# Patient Record
Sex: FEMALE | Race: WHITE | NOT HISPANIC OR LATINO | Employment: OTHER | URBAN - METROPOLITAN AREA
[De-identification: names, ages, dates, MRNs, and addresses within clinical notes are randomized per-mention and may not be internally consistent; named-entity substitution may affect disease eponyms.]

---

## 2023-11-07 ENCOUNTER — OFFICE VISIT (OUTPATIENT)
Dept: PRIMARY CARE | Facility: CLINIC | Age: 83
End: 2023-11-07
Payer: MEDICARE

## 2023-11-07 ENCOUNTER — LAB (OUTPATIENT)
Dept: LAB | Facility: LAB | Age: 83
End: 2023-11-07
Payer: MEDICARE

## 2023-11-07 VITALS — DIASTOLIC BLOOD PRESSURE: 68 MMHG | WEIGHT: 114 LBS | BODY MASS INDEX: 20.85 KG/M2 | SYSTOLIC BLOOD PRESSURE: 114 MMHG

## 2023-11-07 DIAGNOSIS — I10 PRIMARY HYPERTENSION: ICD-10-CM

## 2023-11-07 DIAGNOSIS — Z00.00 HEALTH CARE MAINTENANCE: Primary | ICD-10-CM

## 2023-11-07 DIAGNOSIS — E78.2 MIXED HYPERLIPIDEMIA: ICD-10-CM

## 2023-11-07 DIAGNOSIS — R23.2 HOT FLASHES: ICD-10-CM

## 2023-11-07 DIAGNOSIS — G47.00 INSOMNIA, UNSPECIFIED TYPE: ICD-10-CM

## 2023-11-07 PROCEDURE — 1036F TOBACCO NON-USER: CPT | Performed by: INTERNAL MEDICINE

## 2023-11-07 PROCEDURE — 3074F SYST BP LT 130 MM HG: CPT | Performed by: INTERNAL MEDICINE

## 2023-11-07 PROCEDURE — 99214 OFFICE O/P EST MOD 30 MIN: CPT | Performed by: INTERNAL MEDICINE

## 2023-11-07 PROCEDURE — 36415 COLL VENOUS BLD VENIPUNCTURE: CPT

## 2023-11-07 PROCEDURE — 1159F MED LIST DOCD IN RCRD: CPT | Performed by: INTERNAL MEDICINE

## 2023-11-07 PROCEDURE — 1160F RVW MEDS BY RX/DR IN RCRD: CPT | Performed by: INTERNAL MEDICINE

## 2023-11-07 PROCEDURE — 84443 ASSAY THYROID STIM HORMONE: CPT

## 2023-11-07 PROCEDURE — 3078F DIAST BP <80 MM HG: CPT | Performed by: INTERNAL MEDICINE

## 2023-11-07 PROCEDURE — 1126F AMNT PAIN NOTED NONE PRSNT: CPT | Performed by: INTERNAL MEDICINE

## 2023-11-07 PROCEDURE — G0439 PPPS, SUBSEQ VISIT: HCPCS | Performed by: INTERNAL MEDICINE

## 2023-11-07 PROCEDURE — 99397 PER PM REEVAL EST PAT 65+ YR: CPT | Performed by: INTERNAL MEDICINE

## 2023-11-07 PROCEDURE — 80061 LIPID PANEL: CPT

## 2023-11-07 PROCEDURE — 80053 COMPREHEN METABOLIC PANEL: CPT

## 2023-11-07 PROCEDURE — 93000 ELECTROCARDIOGRAM COMPLETE: CPT | Performed by: INTERNAL MEDICINE

## 2023-11-07 PROCEDURE — 1170F FXNL STATUS ASSESSED: CPT | Performed by: INTERNAL MEDICINE

## 2023-11-07 PROCEDURE — 85027 COMPLETE CBC AUTOMATED: CPT

## 2023-11-07 NOTE — PROGRESS NOTES
Subjective   Patient ID: Jessica Easton is a 82 y.o. female who presents for No chief complaint on file..    HPI CPE see updated front sheet no chest pain no shortness of breath which she sleeps more but is otherwise doing okay questions about the metoprolol when long ago atrial fibrillation taking mostly for blood pressure at this time bones muscles joints okay digestion okay occasional hot flashes sinus okay no dysuria    Past medical history hypertension    Medication metoprolol    Allergies noted and unchanged    Social history no tobacco    Family history noted and unchanged    Prevention discussed with no longer having colonoscopies discussed with mammogram discussed with vaccinations some exercise 1 to 2 mile walk per day some prior blood work reviewed    Depression screen not depressed    Review of Systems    Objective   There were no vitals taken for this visit.    Physical Exam vital signs noted alert and oriented x3 NCAT PERRLA EOMI nares without discharge OP benign TM normal bilateral EAC clear bilateral no AC nodes no JVD or bruit no thyromegaly chest clear to auscultation and percussion CV regular rate and rhythm S1-S2 without murmur gallop or rub abdomen soft nontender normal active bowel sounds no rebound or guarding no HSM LS spine normal curvature negative straight leg raise negative logroll negative SI joint tenderness extremities no clubbing cyanosis or edema normal distal pulses DTR 2+    Assessment/Plan impression General medical examination hypertension other diagnoses insomnia hot flashes hyperlipidemia Parkinson's disease  Plan check EKG advised on heart and continue with metoprolol check Chem-7 advised on glucose potassium and kidney function check CBC advised on blood count check lipid panel advised on cholesterol profile check TSH advised on hot flashes metabolism good diet regular exercise continue with the Sinemet and follow-up with neurology increase water consumption weight  stability advised on mammogram prefers not to have recheck 3 months based on above TT 60 cc 31    See CT scan of chest stable (check)

## 2023-11-08 LAB
ALBUMIN SERPL BCP-MCNC: 4.2 G/DL (ref 3.4–5)
ALP SERPL-CCNC: 56 U/L (ref 33–136)
ALT SERPL W P-5'-P-CCNC: 7 U/L (ref 7–45)
ANION GAP SERPL CALC-SCNC: 13 MMOL/L (ref 10–20)
AST SERPL W P-5'-P-CCNC: 21 U/L (ref 9–39)
BILIRUB SERPL-MCNC: 1.5 MG/DL (ref 0–1.2)
BUN SERPL-MCNC: 11 MG/DL (ref 6–23)
CALCIUM SERPL-MCNC: 9.6 MG/DL (ref 8.6–10.6)
CHLORIDE SERPL-SCNC: 104 MMOL/L (ref 98–107)
CHOLEST SERPL-MCNC: 193 MG/DL (ref 0–199)
CHOLESTEROL/HDL RATIO: 2
CO2 SERPL-SCNC: 30 MMOL/L (ref 21–32)
CREAT SERPL-MCNC: 0.63 MG/DL (ref 0.5–1.05)
ERYTHROCYTE [DISTWIDTH] IN BLOOD BY AUTOMATED COUNT: 12.6 % (ref 11.5–14.5)
GFR SERPL CREATININE-BSD FRML MDRD: 89 ML/MIN/1.73M*2
GLUCOSE SERPL-MCNC: 83 MG/DL (ref 74–99)
HCT VFR BLD AUTO: 46.9 % (ref 36–46)
HDLC SERPL-MCNC: 95.7 MG/DL
HGB BLD-MCNC: 15.2 G/DL (ref 12–16)
LDLC SERPL CALC-MCNC: 80 MG/DL
MCH RBC QN AUTO: 30.6 PG (ref 26–34)
MCHC RBC AUTO-ENTMCNC: 32.4 G/DL (ref 32–36)
MCV RBC AUTO: 95 FL (ref 80–100)
NON HDL CHOLESTEROL: 97 MG/DL (ref 0–149)
NRBC BLD-RTO: 0 /100 WBCS (ref 0–0)
PLATELET # BLD AUTO: 219 X10*3/UL (ref 150–450)
POTASSIUM SERPL-SCNC: 4.5 MMOL/L (ref 3.5–5.3)
PROT SERPL-MCNC: 6.5 G/DL (ref 6.4–8.2)
RBC # BLD AUTO: 4.96 X10*6/UL (ref 4–5.2)
SODIUM SERPL-SCNC: 142 MMOL/L (ref 136–145)
TRIGL SERPL-MCNC: 85 MG/DL (ref 0–149)
TSH SERPL-ACNC: 1.91 MIU/L (ref 0.44–3.98)
VLDL: 17 MG/DL (ref 0–40)
WBC # BLD AUTO: 6.4 X10*3/UL (ref 4.4–11.3)

## 2023-11-08 NOTE — PROGRESS NOTES
C/C:  Follow up visit    History Of Present Illness  Jessica Easton is a 82 y.o. female presenting for follow up with surveillance imaging for personal h/o lung CA x2.  She is most recently s/p a re-do Left VATS upper lobe wedge with [facility] on 12/29/2021 for a PET-avid slowly enlarging nodule - confirmed pT2aN0 adenosquamous CA.      Since her last visit she has been doing well and remaining active with her Parkinsons. Energy level is good. She denies any respiratory sxs including no SOB, cough, wheezing, congestion or other.      By way of review: patient has a prior history of right lung non-small cell lung cancer for which she underwent resection several years ago with Dr. Hall. Additionally she had a history of a left lung nodule for which she underwent a Left VATS (also with Dr. Hall) but pathology of this lesion was benign. On surveillance imaging last month she was noted to have an enlarging left upper lobe nodule. She was seen by my partner, Dr. Hobbs, who ordered a PET scan for her showing avidity and eventually underwent wedge resection; She had post-op appointment with Oncology given b/l lung cancers and VPI on the most recent but 2/to age/frailty the risks were felt to outweigh the benefits.    She is overall very healthy for her age and walks every day    Past Medical History  She has a past medical history of Essential (primary) hypertension (11/20/2014) and Unspecified atrial fibrillation (CMS/HCC) (08/20/2013).    Social History  She reports that she has never smoked. She has never used smokeless tobacco. She reports that she does not currently use alcohol. She reports that she does not use drugs.      Medications    Current Outpatient Medications:     biotin 1 mg capsule, Take by mouth., Disp: , Rfl:     carbidopa-levodopa (Sinemet)  mg tablet, TAKE ONE AND A HALF TABLETS 3 TIMES A DAY, Disp: , Rfl:     cholecalciferol (Vitamin D-3) 50 MCG (2000 UT) tablet, Take 1 tablet (2,000 Units) by  "mouth once daily., Disp: , Rfl:     metoprolol wesley-hydrochlorothiaz (Dutoprol) 25-12.5 mg tablet, Take 2 tablets by mouth once daily., Disp: , Rfl:     metoprolol tartrate (Lopressor) 25 mg tablet, Take 1 tablet (25 mg) by mouth 2 times a day., Disp: , Rfl:     Allergies  Codeine    Review of Systems:  Review of Systems   Constitutional: No fevers, chills, unexpected weight change  HENT: No sore throat, congestion, or nasal drainage  Eyes: No visual changes or eye itching  Respiratory: see HPI. No cough, worsening dyspnea, wheezing  Cardiac: No chest pain, palpitations, or lower extremity edema  Gastrointestinal: No nausea, vomiting, diarrhea. No abdominal pain  Genitourinary: No dysuria or hematuria  Musculoskeletal: No back pain. No significant myalgias or arthralgias  Neurologic: No headaches, dizziness, or seizures.  Hematologic: No east bleeding or bruising.  Psychiatric: No anxiety or depression.    Physical Exam:  Physical Exam  /60 Comment: HIGH BP  Pulse 64   Temp 36.1 °C (97 °F) (Temporal)   Ht 1.575 m (5' 2\")   Wt 52.6 kg (115 lb 14.4 oz)   SpO2 96%   BMI 21.20 kg/m²   Constitutional:       General: Patient is not in acute distress.     Appearance: Normal appearance; not ill-appearing.   HENT:      Head: Normocephalic.      Nose: No congestion or rhinorrhea.   Cardiovascular:      Rate and Rhythm: Normal rate and regular rhythm.      Pulses: Normal pulses.   Pulmonary:      Effort: Pulmonary effort is normal. No respiratory distress.  No conversational dyspnea     Breath sounds: No stridor. No wheezing.   Abdominal:      General: There is no distension.      Palpations: Abdomen is soft.      Tenderness: There is no abdominal tenderness.   Musculoskeletal:         General: No swelling, tenderness or deformity. Normal range of motion.      Cervical back: Normal range of motion. No rigidity.   Lymphadenopathy:      Cervical: No cervical adenopathy.   Skin:     General: Skin is warm and dry. "   Neurological:      General: No focal deficit present.      Mental Status: Patient is alert and oriented to person, place, and time.   Psychiatric:         Mood and Affect: Mood normal.       Relevant Results:     Pathology:  Accession #: [id number]            Pathologist:                   JALEN HERRERA MD  Date of Procedure:    12/29/2021  Date Received:          12/29/2021  Date Reported           1/6/2022  Submitting Physician:   RENAE COSTA DO  Location:                    [facility]  Other External #    Procedures/Addenda Present                                                               FINAL DIAGNOSIS  A.  LUNG, LEFT UPPER LOBE, WEDGE:  --INVASIVE ADENOSQUAMOUS CARCINOMA.  SEE NOTE AND CASE SUMMARY REPORT.    Note: By immunostaining, the glandular component is positive for TTF-1 and  negative for p40, while the squamous component is positive for p40 and negative  for TTF-1.  The morphologic features and immunostaining results are supportive  of the above diagnosis.  A Movat stain (slide A1) shows evidence of visceral  pleural invasion.  Molecular testing has been ordered and the results will be  issued in an addendum.     B.  HILAR LYMPH NODE:    --EXTENSIVELY HYALINIZED AND CALCIFIED LYMPH NODE WITH NO MALIGNANCY  IDENTIFIED.    C.  INTERNAL MAMMARY LYMPH NODE:    --LYMPH NODE WITH NO EVIDENCE OF MALIGNANCY.    D.  LEVEL 8 LYMPH NODE:    --LYMPH NODE WITH NO EVIDENCE OF MALIGNANCY.    E.  LEVEL 7 LYMPH NODE:    --LYMPH NODE WITH NO EVIDENCE OF MALIGNANCY.    F.  ADDITIONAL LEFT UPPER LOBE MARGIN:    --LUNG PARENCHYMA WITH NO EVIDENCE OF MALIGNANCY.    :  Dr. Wadad Mneimneh (selected slides from part A)    nery    The gross and/or microscopic findings were reviewed in conjunction with  pathology resident, Natalia Gannon M.D.                                                                                                                                                 CASE SUMMARY REPORT        SPECIMEN  Procedure:      Wedge resection  Specimen Laterality:      Left  TUMOR  Tumor Focality:      Single focus  Tumor Site:      Upper lobe of lung     Tumor Size     Total Tumor Size (size of entire tumor):      Greatest Dimension  (Centimeters): 1.0 cm  Histologic Type:      Adenosquamous carcinoma  Visceral Pleura Invasion:      Present  Direct Invasion of Adjacent Structures:      Not applicable (no adjacent  structures present)  Treatment Effect:      No known presurgical therapy  Lymphovascular Invasion:      Not identified  MARGINS  Margin Status for Invasive Carcinoma:      All margins negative for invasive  carcinoma   Closest Margin(s) to Invasive Carcinoma:        Parenchymal   Distance from Invasive Carcinoma to Closest Margin:         At least: 0.5 cm  Margin Status for Non-Invasive Tumor:      Not applicable  REGIONAL LYMPH NODES  Lymph Node(s) from Prior Procedures:      No known prior lymph node sampling  performed  Regional Lymph Node Status:        All regional lymph nodes negative for tumor   Number of Lymph Nodes Examined:         4    Jyoti Site(s) Examined:         7: Subcarinal          8L: Para-esophageal          10L: Hilar          Left: internal mammary  PATHOLOGIC STAGE CLASSIFICATION (pTNM, AJCC 8th Edition)  pT Category:      pT2a  pN Category:      pN0  ADDITIONAL FINDINGS  Additional Findings:      Inflammation: Scattered minute non-necrotizing  granulomas; foci of organizing pneumonia       Fibrosis: Patchy interstitial fibrosis       Emphysema        Respiratory bronchiolitis     Imaging:    CT Chest from today personally reviewed     Assessment/Plan   Diagnoses and all orders for this visit:  NSCLC of left lung (CMS/HCC)  Malignant neoplasm of lung, unspecified laterality, unspecified part of lung (CMS/HCC)  -     CT chest wo IV contrast; Future         Jessicadelilah Easton is a 82 y.o. female s/p re-do Lt VATS wedge for an upper lobe pT2aN0 adenosquamous on 12/29/2021. She is  doing well overall today. CT chest today shows no concerning or acute lesions. Will plan for repeat CT chest in 1 year.       Natalie Chatterjee, DO  Thoracic & Esophageal Surgery

## 2023-11-09 RX ORDER — CHOLECALCIFEROL (VITAMIN D3) 50 MCG
1 TABLET ORAL DAILY
COMMUNITY

## 2023-11-09 RX ORDER — METOPROLOL TARTRATE 25 MG/1
25 TABLET, FILM COATED ORAL 2 TIMES DAILY
COMMUNITY

## 2023-11-09 RX ORDER — CARBIDOPA AND LEVODOPA 25; 100 MG/1; MG/1
TABLET ORAL
COMMUNITY
End: 2023-12-28

## 2023-11-12 ASSESSMENT — ENCOUNTER SYMPTOMS
OCCASIONAL FEELINGS OF UNSTEADINESS: 0
DEPRESSION: 0
LOSS OF SENSATION IN FEET: 0

## 2023-11-12 ASSESSMENT — ACTIVITIES OF DAILY LIVING (ADL)
MANAGING_FINANCES: INDEPENDENT
DOING_HOUSEWORK: INDEPENDENT
TAKING_MEDICATION: INDEPENDENT
BATHING: INDEPENDENT
DRESSING: INDEPENDENT
GROCERY_SHOPPING: INDEPENDENT

## 2023-11-12 ASSESSMENT — PATIENT HEALTH QUESTIONNAIRE - PHQ9
SUM OF ALL RESPONSES TO PHQ9 QUESTIONS 1 AND 2: 0
1. LITTLE INTEREST OR PLEASURE IN DOING THINGS: NOT AT ALL
2. FEELING DOWN, DEPRESSED OR HOPELESS: NOT AT ALL

## 2023-11-14 ENCOUNTER — OFFICE VISIT (OUTPATIENT)
Dept: SURGERY | Facility: CLINIC | Age: 83
End: 2023-11-14
Payer: MEDICARE

## 2023-11-14 ENCOUNTER — ANCILLARY PROCEDURE (OUTPATIENT)
Dept: RADIOLOGY | Facility: CLINIC | Age: 83
End: 2023-11-14
Payer: MEDICARE

## 2023-11-14 VITALS
WEIGHT: 115.9 LBS | DIASTOLIC BLOOD PRESSURE: 60 MMHG | SYSTOLIC BLOOD PRESSURE: 171 MMHG | BODY MASS INDEX: 21.33 KG/M2 | OXYGEN SATURATION: 96 % | HEIGHT: 62 IN | TEMPERATURE: 97 F | HEART RATE: 64 BPM

## 2023-11-14 DIAGNOSIS — C34.90 MALIGNANT NEOPLASM OF UNSPECIFIED PART OF UNSPECIFIED BRONCHUS OR LUNG (MULTI): ICD-10-CM

## 2023-11-14 DIAGNOSIS — C34.92 NSCLC OF LEFT LUNG (MULTI): Primary | ICD-10-CM

## 2023-11-14 DIAGNOSIS — C34.90 MALIGNANT NEOPLASM OF LUNG, UNSPECIFIED LATERALITY, UNSPECIFIED PART OF LUNG (MULTI): ICD-10-CM

## 2023-11-14 PROCEDURE — 71250 CT THORAX DX C-: CPT | Performed by: RADIOLOGY

## 2023-11-14 PROCEDURE — 99214 OFFICE O/P EST MOD 30 MIN: CPT | Performed by: STUDENT IN AN ORGANIZED HEALTH CARE EDUCATION/TRAINING PROGRAM

## 2023-11-14 PROCEDURE — 1126F AMNT PAIN NOTED NONE PRSNT: CPT | Performed by: STUDENT IN AN ORGANIZED HEALTH CARE EDUCATION/TRAINING PROGRAM

## 2023-11-14 PROCEDURE — 71250 CT THORAX DX C-: CPT

## 2023-11-14 PROCEDURE — 1036F TOBACCO NON-USER: CPT | Performed by: STUDENT IN AN ORGANIZED HEALTH CARE EDUCATION/TRAINING PROGRAM

## 2023-11-14 PROCEDURE — 1159F MED LIST DOCD IN RCRD: CPT | Performed by: STUDENT IN AN ORGANIZED HEALTH CARE EDUCATION/TRAINING PROGRAM

## 2023-11-14 PROCEDURE — 99214 OFFICE O/P EST MOD 30 MIN: CPT | Mod: 25 | Performed by: STUDENT IN AN ORGANIZED HEALTH CARE EDUCATION/TRAINING PROGRAM

## 2023-11-14 PROCEDURE — 1160F RVW MEDS BY RX/DR IN RCRD: CPT | Performed by: STUDENT IN AN ORGANIZED HEALTH CARE EDUCATION/TRAINING PROGRAM

## 2023-11-14 RX ORDER — NICOTINE POLACRILEX 2 MG
GUM BUCCAL
COMMUNITY

## 2023-11-14 ASSESSMENT — PAIN SCALES - GENERAL: PAINLEVEL: 0-NO PAIN

## 2023-11-16 ENCOUNTER — TELEPHONE (OUTPATIENT)
Dept: PRIMARY CARE | Facility: CLINIC | Age: 83
End: 2023-11-16
Payer: MEDICARE

## 2023-12-28 DIAGNOSIS — G20.A1 PARKINSON'S DISEASE (MULTI): ICD-10-CM

## 2023-12-28 RX ORDER — CARBIDOPA AND LEVODOPA 25; 100 MG/1; MG/1
TABLET ORAL
Qty: 405 TABLET | Refills: 0 | Status: SHIPPED | OUTPATIENT
Start: 2023-12-28 | End: 2024-03-25

## 2024-03-14 ENCOUNTER — TELEPHONE (OUTPATIENT)
Dept: PRIMARY CARE | Facility: CLINIC | Age: 84
End: 2024-03-14
Payer: MEDICARE

## 2024-03-25 DIAGNOSIS — G20.A1 PARKINSON'S DISEASE (MULTI): ICD-10-CM

## 2024-03-25 RX ORDER — CARBIDOPA AND LEVODOPA 25; 100 MG/1; MG/1
TABLET ORAL
Qty: 270 TABLET | Refills: 1 | Status: SHIPPED | OUTPATIENT
Start: 2024-03-25 | End: 2024-04-04 | Stop reason: SDUPTHER

## 2024-04-01 ENCOUNTER — TELEPHONE (OUTPATIENT)
Dept: NEUROLOGY | Facility: CLINIC | Age: 84
End: 2024-04-01
Payer: MEDICARE

## 2024-04-01 NOTE — TELEPHONE ENCOUNTER
She thinks she might need to increase her carb/levo, is not holding her until her next dose, seems ware off, she is very shaky by the time she needs to take it again. Her fuv not until [place]

## 2024-04-04 ENCOUNTER — OFFICE VISIT (OUTPATIENT)
Dept: NEUROLOGY | Facility: CLINIC | Age: 84
End: 2024-04-04
Payer: MEDICARE

## 2024-04-04 VITALS
WEIGHT: 114 LBS | DIASTOLIC BLOOD PRESSURE: 73 MMHG | SYSTOLIC BLOOD PRESSURE: 139 MMHG | BODY MASS INDEX: 20.98 KG/M2 | HEIGHT: 62 IN | HEART RATE: 62 BPM

## 2024-04-04 DIAGNOSIS — G20.A1 PARKINSON'S DISEASE (MULTI): Primary | ICD-10-CM

## 2024-04-04 DIAGNOSIS — M54.2 NECK PAIN: ICD-10-CM

## 2024-04-04 PROCEDURE — 1036F TOBACCO NON-USER: CPT | Performed by: PSYCHIATRY & NEUROLOGY

## 2024-04-04 PROCEDURE — 99214 OFFICE O/P EST MOD 30 MIN: CPT | Performed by: PSYCHIATRY & NEUROLOGY

## 2024-04-04 RX ORDER — CARBIDOPA AND LEVODOPA 25; 100 MG/1; MG/1
1.5 TABLET ORAL 4 TIMES DAILY
Qty: 270 TABLET | Refills: 1 | Status: SHIPPED | OUTPATIENT
Start: 2024-04-04

## 2024-04-04 NOTE — PATIENT INSTRUCTIONS
We discussed that you are experiencing wearing off of the effective carbidopa/levodopa.  This is common in people with Parkinson's disease and very manageable.    I recommend increasing to 4 doses a day by taking 1.5 tablets at 6 AM, 10 AM, 2 PM and 6 PM.    If you still note wearing off of the effect before you get to your next dose, contact my office and we will consider adding entacapone (a booster) to each dose.    For neck pain we discussed doing range of motion exercises every morning and bedtime.  If this does not adequately relieve the neck pain, you can use acetaminophen (Tylenol) 500 mg as needed, as long as you do not exceed 4 doses per 24 hours.    I'm glad to hear that you are doing so much walking and doing regular exercise classes.  These activities are very beneficial for Parkinson's disease and you should keep them up.    Please see me in the office in 8 months.

## 2024-04-04 NOTE — PROGRESS NOTES
Subjective     Jessica Easton is a 83 y.o. year old female seen in follow-up for Parkinson's disease.    HPI    83-year-old right-handed  woman with past medical history significant for hypertension, atrial fibrillation, right upper lobe lung cancer diagnosed 2008 status post wedge resection, osteopenia, chronic insomnia, hysterectomy, remote former smoking.     I evaluated her initially on 7/25/2022. As detailed in my ambulatory EMR note from that date she presented with complaint of a tremor starting in the left hand and subsequently spreading to other limbs, noted for more than a year. 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 She exhibited rest tremor of left more than right limbs and also bradykinesia and hypokinesia of left more than right limbs as well as reduced blink rate, mildly micrographic handwriting and dexterity complaints. Gait was unremarkable. Head CT was without features of hydrocephalus or significant vascular burden. I discussed the option of a Sinemet trial and she was interested in pursuing this.     I evaluated her again on 11/15/2022. She reported a gratifying response to Sinemet with improvement in coordination, handwriting, and dexterity. She was tolerating 1 tablet of 25/100 mg immediate release 3 times daily quite well. I discussed the option of an increase to 1.5 tablets 3 times daily and she was interested in proceeding with this.    I evaluated her most recently on 6/7/2023.  As detailed in my ambulatory EMR note from that date she continued to note a gratifying response to Sinemet at a dose of 1.5 tablets 3 times daily.  Despite spacing every 6 hours she was not noting wearing off at that time.    She is evaluated again today in the office, accompanied by her daughter.    She contacted me recently regarding wearing off of Sinemet effect.  She has been perceiving lately that about 4 hours after a dose she starts to note this, in  "particular with tremulousness that starts in the left lower extremity and spreads to the rest of the body.    Her current regimen is Sinemet 25/100 mg immediate release 1.5 tablets 3 times daily with doses taken at 6 AM, noon and 6 PM.  30-40 minutes after each dose she feels the effect to kick in.    She reports tolerating Sinemet well without nausea and takes it with just a bit of food, not a full meal.    She indicates no significant issues with mobility.  She is walking 5000 steps a day.  She attends an exercise class twice a week.  She has not noted freezing or festination.  She is not requiring assistive devices.  She denies recent falls.    She denies orthostatic symptoms.    She indicates some minor overactive bladder symptoms and on rare occasions urgency incontinence.  She endorses constipation which is manageable with fiber powder taken twice daily and fruit and vegetables in the diet.  She acknowledges that she could hydrate more conscientiously.    She is bothered by pain in the bilateral posterior cervical regions which her daughter thinks may relate to a tendency to hold her head forward when walking.  She does stretches occasionally but not any kind of regular regimen.  She does not typically take anything for the pain.  She has good and bad days in this regard.    She does not endorse symptoms of REM behavior disorder but does indicate that for decades, long preceding her diagnosis of PD, she has had the experience of a \"hot flash\" in the middle of the night typically about 90 minutes into sleep.  She has no trouble with sleep initiation but once she experiences this symptom, she has difficulty getting back to sleep.       Review of Systems    As per the history of present illness    Patient Active Problem List   Diagnosis    NSCLC of left lung (CMS/HCC)     Past Medical History:   Diagnosis Date    Essential (primary) hypertension 11/20/2014    Benign essential hypertension    Unspecified atrial " fibrillation (CMS/[facility]) 08/20/2013    Atrial fibrillation     Past Surgical History:   Procedure Laterality Date    HYSTERECTOMY  04/24/2015    Hysterectomy    LUNG SURGERY  12/07/2021    Lung Surgery    OTHER SURGICAL HISTORY  11/18/2022    Lung wedge resection    OTHER SURGICAL HISTORY  04/24/2015    Wrist Carpectomy    TONSILLECTOMY  10/21/2015    Tonsillectomy     Social History     Tobacco Use    Smoking status: Never    Smokeless tobacco: Never   Substance Use Topics    Alcohol use: Not Currently     family history is not on file.    Current Outpatient Medications:     biotin 1 mg capsule, Take by mouth., Disp: , Rfl:     carbidopa-levodopa (Sinemet)  mg tablet, INCREASE AS DIRECTED TO ONE TABLET 3 TIMES A DAY., Disp: 270 tablet, Rfl: 1    cholecalciferol (Vitamin D-3) 50 MCG (2000 UT) tablet, Take 1 tablet (2,000 Units) by mouth once daily., Disp: , Rfl:     metoprolol wesley-hydrochlorothiaz (Dutoprol) 25-12.5 mg tablet, Take 2 tablets by mouth once daily., Disp: , Rfl:     metoprolol tartrate (Lopressor) 25 mg tablet, Take 1 tablet (25 mg) by mouth 2 times a day., Disp: , Rfl:   Allergies   Allergen Reactions    Codeine Nausea/vomiting       Objective   Neurological Exam  Physical Exam    Physical Examination:    General: Alert woman who was ambulatory without assistive devices.      Mental Status: Clear sensorium without fluctuation.  Appropriate conversation.  Fluent unremarkable speech without paraphasic errors or hesitancy.  Followed instructions accurately and promptly without bradyphrenia.    Cranial Nerves: Mildly reduced blink rate.  No gaze deviation or ptosis.  Symmetrically intact facial motor function.  No significant hypomimia.  Mildly hard of hearing.  No dysarthria, dysphonia or hypophonia.    Motor: Muscle tone was normal in the right limbs and marginally increased at the left elbow and left knee.  Confrontation strength was symmetrically 5/5 throughout.  She easily stood from the office  chair with arms crossed over her chest.    Coordination: No postural or rest tremor, myoclonus or dystonic posturing.  No dyskinesia.  Repetitive hand movements marginally lower in amplitude on the left compared to the right.  Repetitive foot taps moderately lower in amplitude and slower on the left compared to right.    Station: Intact and stable.    Gait: Stable and unremarkable.  She moved fluidly at a moderate pace with symmetric arm swing and no shuffling, freezing or festination.  No significant postural flexion.    Other: Neck active range of motion was full in all directions and painless.        Assessment/Plan     She continues to note significant benefit from Sinemet but has lately noted wearing off of affect at roughly 4 hours, manifested by return of tremor.    I reviewed strategies for wearing off.  I discussed closer spacing of doses versus adding a Sinemet booster such as entacapone.  We decided on closer spacing first and I advised changing dosage times to 6 AM, 10 AM, 2 PM and 6 PM.    She asked about increasing the Sinemet dose but I do not think that she requires a higher dose than 1.5 tablets given that she appears well-controlled at the time of the visit today after her morning dose.    I advised her to contact the office if she is still experiencing wearing off despite every 4 hour spacing of doses, in which case I would consider adding entacapone.    I encouraged her to continue her daily walking regimen as well as regular exercise classes.    I reviewed active range of motion exercises for her to perform every morning and night to hopefully reduce the likelihood of developing neck pain.  I advised that she can take acetaminophen 500 mg up to 4 doses per 24 hours if needed for neck pain.    I advised her to follow-up in the office in 8 months.

## 2024-06-17 ENCOUNTER — TELEPHONE (OUTPATIENT)
Dept: PRIMARY CARE | Facility: CLINIC | Age: 84
End: 2024-06-17

## 2024-06-17 ENCOUNTER — TELEMEDICINE (OUTPATIENT)
Dept: PRIMARY CARE | Facility: CLINIC | Age: 84
End: 2024-06-17
Payer: MEDICARE

## 2024-06-17 DIAGNOSIS — U07.1 COVID-19 VIRUS INFECTION: Primary | ICD-10-CM

## 2024-06-17 DIAGNOSIS — R55 SYNCOPE, UNSPECIFIED SYNCOPE TYPE: ICD-10-CM

## 2024-06-17 DIAGNOSIS — J01.90 ACUTE SINUSITIS, RECURRENCE NOT SPECIFIED, UNSPECIFIED LOCATION: ICD-10-CM

## 2024-06-17 PROCEDURE — 99442 PR PHYS/QHP TELEPHONE EVALUATION 11-20 MIN: CPT | Performed by: INTERNAL MEDICINE

## 2024-06-17 NOTE — PROGRESS NOTES
Subjective   Patient ID: Jessica Easton is a 83 y.o. female who presents for No chief complaint on file..    HPI patient initiated telehealth visit this visit was completed via telephone secondary to the restrictions of the COVID-19 pandemic all medical issues were addressed and discussed with patient patient was not otherwise examined if it was felt that the patient needed to be seen in the office they would have been referred to do so patient verbally consented to visit  Sick visit had tested positive for COVID today after 2 days of prodromal illness mostly having sinus type symptoms although this morning before she had taken her carbidopa levodopa medicine she had gently she thinks passed out she had lowered to the floor thinks she may have been awake during that time was not injured in any way did not hit her head then was able to slowly get up and proceed with her breakfast and medication had had low-grade fever over the past 2 days sinus discharge not much in the way of coughing some rundown feeling no chest pain no shortness of breath    Review of Systems    Objective   There were no vitals taken for this visit.    Physical Exam alert and oriented x 3 breathing unlabored not otherwise examined    Assessment/Plan  impression covid + syncope sinusitis   plan ok for Paxlovid therapy 3 tablets in the morning 3 tablets in the evening x 5 days see EMR risk-benefit side effects reviewed with her and wishes to proceed May use Tylenol as needed for the fever Zyrtec as needed for the sinus her medications were reviewed as for the episode earlier advised on maintaining hydration in a week where the weather is to be greater than 90 degrees and check on her home blood pressures if needed then recheck if no better and for regular visit

## 2024-06-23 NOTE — PROGRESS NOTES
Jovan Valdes MD  Physician  Primary Care     Progress Notes      Sign when Signing Visit     Creation Time: 6/17/2024  6:00 PM     Sign when Signing Visit       Expand All Collapse All       Subjective   Patient ID: Jessica Easton is a 83 y.o. female who presents for No chief complaint on file..     HPI patient initiated telehealth visit this visit was completed via telephone secondary to the restrictions of the COVID-19 pandemic all medical issues were addressed and discussed with patient patient was not otherwise examined if it was felt that the patient needed to be seen in the office they would have been referred to do so patient verbally consented to visit  Sick visit had tested positive for COVID today after 2 days of prodromal illness mostly having sinus type symptoms although this morning before she had taken her carbidopa levodopa medicine she had gently she thinks passed out she had lowered to the floor thinks she may have been awake during that time was not injured in any way did not hit her head then was able to slowly get up and proceed with her breakfast and medication had had low-grade fever over the past 2 days sinus discharge not much in the way of coughing some rundown feeling no chest pain no shortness of breath     Review of Systems           Objective   There were no vitals taken for this visit.     Physical Exam alert and oriented x 3 breathing unlabored not otherwise examined           Assessment/Plan   impression covid + syncope sinusitis  plan ok for Paxlovid therapy 3 tablets in the morning 3 tablets in the evening x 5 days see EMR risk-benefit side effects reviewed with her and wishes to proceed May use Tylenol as needed for the fever Zyrtec as needed for the sinus her medications were reviewed as for the episode earlier advised on maintaining hydration in a week where the weather is to be greater than 90 degrees and check on her home blood pressures if needed then recheck if no  better and for regular visit                            Orders Only on 6/17/2024       Additional Documentation    Encounter Info: Billing Info,     History,     Allergies     Orders Placed    None  Medication Changes      nirmatrelvir/ritonavir 300 mg (150 mg x 2)-100 mg 3 tablets oral 2 times daily, Follow the instructions on the package  Medication List  Visit Diagnoses      COVID-19 virus infection  Problem List

## 2024-07-24 DIAGNOSIS — G20.A1 PARKINSON'S DISEASE (MULTI): ICD-10-CM

## 2024-07-24 RX ORDER — CARBIDOPA AND LEVODOPA 25; 100 MG/1; MG/1
1.5 TABLET ORAL 4 TIMES DAILY
Qty: 270 TABLET | Refills: 1 | Status: SHIPPED | OUTPATIENT
Start: 2024-07-24

## 2024-09-27 ENCOUNTER — APPOINTMENT (OUTPATIENT)
Dept: DERMATOLOGY | Facility: CLINIC | Age: 84
End: 2024-09-27
Payer: MEDICARE

## 2024-10-19 DIAGNOSIS — G20.A1 PARKINSON'S DISEASE (MULTI): ICD-10-CM

## 2024-10-21 RX ORDER — CARBIDOPA AND LEVODOPA 25; 100 MG/1; MG/1
1.5 TABLET ORAL 4 TIMES DAILY
Qty: 540 TABLET | Refills: 1 | Status: SHIPPED | OUTPATIENT
Start: 2024-10-21

## 2024-11-12 ENCOUNTER — OFFICE VISIT (OUTPATIENT)
Dept: SURGERY | Facility: CLINIC | Age: 84
End: 2024-11-12
Payer: MEDICARE

## 2024-11-12 ENCOUNTER — APPOINTMENT (OUTPATIENT)
Dept: PRIMARY CARE | Facility: CLINIC | Age: 84
End: 2024-11-12
Payer: MEDICARE

## 2024-11-12 ENCOUNTER — HOSPITAL ENCOUNTER (OUTPATIENT)
Dept: RADIOLOGY | Facility: CLINIC | Age: 84
Discharge: HOME | End: 2024-11-12
Payer: MEDICARE

## 2024-11-12 VITALS
OXYGEN SATURATION: 98 % | DIASTOLIC BLOOD PRESSURE: 78 MMHG | HEART RATE: 69 BPM | SYSTOLIC BLOOD PRESSURE: 181 MMHG | HEIGHT: 62 IN | TEMPERATURE: 97.3 F | WEIGHT: 108 LBS | BODY MASS INDEX: 19.88 KG/M2

## 2024-11-12 DIAGNOSIS — C34.90 MALIGNANT NEOPLASM OF LUNG, UNSPECIFIED LATERALITY, UNSPECIFIED PART OF LUNG (MULTI): ICD-10-CM

## 2024-11-12 PROCEDURE — 99214 OFFICE O/P EST MOD 30 MIN: CPT | Performed by: STUDENT IN AN ORGANIZED HEALTH CARE EDUCATION/TRAINING PROGRAM

## 2024-11-12 PROCEDURE — 1159F MED LIST DOCD IN RCRD: CPT | Performed by: STUDENT IN AN ORGANIZED HEALTH CARE EDUCATION/TRAINING PROGRAM

## 2024-11-12 PROCEDURE — 71250 CT THORAX DX C-: CPT

## 2024-11-12 PROCEDURE — 71250 CT THORAX DX C-: CPT | Performed by: STUDENT IN AN ORGANIZED HEALTH CARE EDUCATION/TRAINING PROGRAM

## 2024-11-12 PROCEDURE — 99214 OFFICE O/P EST MOD 30 MIN: CPT | Mod: 25 | Performed by: STUDENT IN AN ORGANIZED HEALTH CARE EDUCATION/TRAINING PROGRAM

## 2024-11-12 NOTE — PROGRESS NOTES
C/C:  Follow up visit    History Of Present Illness  Jessica Easton is a 83 y.o. female presenting for follow up with surveillance imaging for personal h/o lung CA x2. She is most recently s/p a re-do Left VATS upper lobe wedge with MLND on 12/29/2021 for a PET-avid slowly enlarging nodule - confirmed pT2aN0 adenosquamous CA.      Since her last visit she has been doing well overall. Feeling frustrated with her Parkinsons. Having some worsening issues with sleep. Energy level is good. She denies any respiratory sxs including no SOB, cough, wheezing, congestion or other.      By way of review: patient has a prior history of right lung non-small cell lung cancer for which she underwent resection several years ago with Dr. Hall. Additionally she had a history of a left lung nodule for which she underwent a Left VATS (also with Dr. Hall) but pathology of this lesion was benign. On surveillance imaging last month she was noted to have an enlarging left upper lobe nodule. She was seen by my partner, Dr. Hobbs, who ordered a PET scan for her showing avidity and eventually underwent wedge resection; She had post-op appointment with Oncology given b/l lung cancers and VPI on the most recent but 2/to age/frailty the risks were felt to outweigh the benefits.   She is overall very healthy for her age other than her Parkinsons disease and walks every day    Past Medical History  She has a past medical history of Essential (primary) hypertension (11/20/2014) and Unspecified atrial fibrillation (Multi) (08/20/2013).    Social History  She reports that she has never smoked. She has never used smokeless tobacco. She reports that she does not currently use alcohol. She reports that she does not use drugs.      Medications    Current Outpatient Medications:     biotin 1 mg capsule, Take by mouth., Disp: , Rfl:     CALCIUM ORAL, Take by mouth., Disp: , Rfl:     carbidopa-levodopa (Sinemet)  mg tablet, TAKE 1.5 TABLETS BY  "MOUTH 4 TIMES A DAY. TAKE DOSES AT 6 AM, 10 AM, 2 PM AND 6 PM., Disp: 540 tablet, Rfl: 1    cholecalciferol (Vitamin D-3) 50 MCG (2000 UT) tablet, Take 1 tablet (2,000 Units) by mouth once daily., Disp: , Rfl:     cyanocobalamin, vitamin B-12, (VITAMIN B-12 ORAL), Take by mouth., Disp: , Rfl:     FIBER, PSYLLIUM HUSK, ORAL, Take by mouth., Disp: , Rfl:     metoprolol wesley-hydrochlorothiaz (Dutoprol) 25-12.5 mg tablet, Take 2 tablets by mouth once daily., Disp: , Rfl:     metoprolol tartrate (Lopressor) 25 mg tablet, TAKE 1 TABLET BY MOUTH TWICE A DAY (Patient not taking: Reported on 11/12/2024), Disp: 180 tablet, Rfl: 1    Allergies  Codeine    Review of Systems:  Review of Systems   Constitutional: No fevers, chills, unexpected weight change  HENT: No sore throat, congestion, or nasal drainage  Eyes: No visual changes or eye itching  Respiratory: see HPI. No cough, worsening dyspnea, wheezing  Cardiac: No chest pain, palpitations, or lower extremity edema  Gastrointestinal: No nausea, vomiting, diarrhea. No abdominal pain  Genitourinary: No dysuria or hematuria  Musculoskeletal: No back pain. No significant myalgias or arthralgias  Neurologic: No headaches, dizziness, or seizures.  Hematologic: No east bleeding or bruising.  Psychiatric: No anxiety or depression.    Physical Exam:  Physical Exam  BP (!) 181/78   Pulse 69   Temp 36.3 °C (97.3 °F)   Ht 1.575 m (5' 2\")   Wt 49 kg (108 lb)   SpO2 98%   BMI 19.75 kg/m²   Constitutional:       General: Patient is not in acute distress.     Appearance: Normal appearance; not ill-appearing.   HENT:      Head: Normocephalic.      Nose: No congestion or rhinorrhea.   Cardiovascular:      Rate and Rhythm: Normal rate and regular rhythm.      Pulses: Normal pulses.   Pulmonary:      Effort: Pulmonary effort is normal. No respiratory distress.  No conversational dyspnea     Breath sounds: No stridor. No wheezing.   Abdominal:      General: There is no distension.      " Palpations: Abdomen is soft.      Tenderness: There is no abdominal tenderness.   Musculoskeletal:         General: No swelling, tenderness or deformity. Normal range of motion.      Cervical back: Normal range of motion. No rigidity.   Lymphadenopathy:      Cervical: No cervical adenopathy.   Skin:     General: Skin is warm and dry.   Neurological:      General: No focal deficit present.      Mental Status: Patient is alert and oriented to person, place, and time.   Psychiatric:         Mood and Affect: Mood normal.       Relevant Results:     Pathology:  Accession #: [id number]            Pathologist:                   JALEN HERRERA MD  Date of Procedure:    12/29/2021  Date Received:          12/29/2021  Date Reported           1/6/2022  Submitting Physician:   RENAE COSTA DO  Location:                    [facility]  Other External #    Procedures/Addenda Present                                                               FINAL DIAGNOSIS  A.  LUNG, LEFT UPPER LOBE, WEDGE:  --INVASIVE ADENOSQUAMOUS CARCINOMA.  SEE NOTE AND CASE SUMMARY REPORT.    Note: By immunostaining, the glandular component is positive for TTF-1 and  negative for p40, while the squamous component is positive for p40 and negative  for TTF-1.  The morphologic features and immunostaining results are supportive  of the above diagnosis.  A Movat stain (slide A1) shows evidence of visceral  pleural invasion.  Molecular testing has been ordered and the results will be  issued in an addendum.     B.  HILAR LYMPH NODE:    --EXTENSIVELY HYALINIZED AND CALCIFIED LYMPH NODE WITH NO MALIGNANCY  IDENTIFIED.    C.  INTERNAL MAMMARY LYMPH NODE:    --LYMPH NODE WITH NO EVIDENCE OF MALIGNANCY.    D.  LEVEL 8 LYMPH NODE:    --LYMPH NODE WITH NO EVIDENCE OF MALIGNANCY.    E.  LEVEL 7 LYMPH NODE:    --LYMPH NODE WITH NO EVIDENCE OF MALIGNANCY.    F.  ADDITIONAL LEFT UPPER LOBE MARGIN:    --LUNG PARENCHYMA WITH NO EVIDENCE OF MALIGNANCY.    :   Dr. Wadad Mneimneh (selected slides from part A)    jmirandaw    The gross and/or microscopic findings were reviewed in conjunction with  pathology resident, Natalia Gannon M.D.                                                                                                                                                 CASE SUMMARY REPORT       SPECIMEN  Procedure:      Wedge resection  Specimen Laterality:      Left  TUMOR  Tumor Focality:      Single focus  Tumor Site:      Upper lobe of lung     Tumor Size     Total Tumor Size (size of entire tumor):      Greatest Dimension  (Centimeters): 1.0 cm  Histologic Type:      Adenosquamous carcinoma  Visceral Pleura Invasion:      Present  Direct Invasion of Adjacent Structures:      Not applicable (no adjacent  structures present)  Treatment Effect:      No known presurgical therapy  Lymphovascular Invasion:      Not identified  MARGINS  Margin Status for Invasive Carcinoma:      All margins negative for invasive  carcinoma   Closest Margin(s) to Invasive Carcinoma:        Parenchymal   Distance from Invasive Carcinoma to Closest Margin:         At least: 0.5 cm  Margin Status for Non-Invasive Tumor:      Not applicable  REGIONAL LYMPH NODES  Lymph Node(s) from Prior Procedures:      No known prior lymph node sampling  performed  Regional Lymph Node Status:        All regional lymph nodes negative for tumor   Number of Lymph Nodes Examined:         4    Jyoti Site(s) Examined:         7: Subcarinal          8L: Para-esophageal          10L: Hilar          Left: internal mammary  PATHOLOGIC STAGE CLASSIFICATION (pTNM, AJCC 8th Edition)  pT Category:      pT2a  pN Category:      pN0  ADDITIONAL FINDINGS  Additional Findings:      Inflammation: Scattered minute non-necrotizing  granulomas; foci of organizing pneumonia       Fibrosis: Patchy interstitial fibrosis       Emphysema        Respiratory bronchiolitis     Imaging:    CT Chest from today personally reviewed      Assessment/Plan   Diagnoses and all orders for this visit:  Malignant neoplasm of lung, unspecified laterality, unspecified part of lung (Multi)  -     CT chest wo IV contrast; Future     Jessicamadeline Easton is a 83 y.o. female s/p re-do Lt VATS wedge for an upper lobe pT2aN0 adenosquamous on 12/29/2021. She is doing well overall today. CT chest today shows no ZULAY. Will plan for repeat CT chest in 1 year.       Natalie Chatterjee, DO  Thoracic & Esophageal Surgery

## 2024-11-12 NOTE — PATIENT INSTRUCTIONS
"Follow up in 1 year with CT chest prior. See appointment below in \"What's Next\".  Call our office with any questions. [phone]   "

## 2024-11-13 ENCOUNTER — LAB (OUTPATIENT)
Dept: LAB | Facility: LAB | Age: 84
End: 2024-11-13
Payer: MEDICARE

## 2024-11-13 ENCOUNTER — APPOINTMENT (OUTPATIENT)
Dept: PRIMARY CARE | Facility: CLINIC | Age: 84
End: 2024-11-13
Payer: MEDICARE

## 2024-11-13 VITALS
HEART RATE: 66 BPM | OXYGEN SATURATION: 97 % | DIASTOLIC BLOOD PRESSURE: 75 MMHG | BODY MASS INDEX: 19.57 KG/M2 | SYSTOLIC BLOOD PRESSURE: 127 MMHG | WEIGHT: 107 LBS

## 2024-11-13 DIAGNOSIS — G20.A1 PARKINSON'S DISEASE WITHOUT DYSKINESIA, UNSPECIFIED WHETHER MANIFESTATIONS FLUCTUATE: ICD-10-CM

## 2024-11-13 DIAGNOSIS — M54.2 CERVICALGIA OF OCCIPITO-ATLANTO-AXIAL REGION: ICD-10-CM

## 2024-11-13 DIAGNOSIS — Z00.00 HEALTH CARE MAINTENANCE: Primary | ICD-10-CM

## 2024-11-13 DIAGNOSIS — I10 PRIMARY HYPERTENSION: ICD-10-CM

## 2024-11-13 DIAGNOSIS — E78.2 MIXED HYPERLIPIDEMIA: ICD-10-CM

## 2024-11-13 LAB
ALBUMIN SERPL BCP-MCNC: 4.2 G/DL (ref 3.4–5)
ALP SERPL-CCNC: 66 U/L (ref 33–136)
ALT SERPL W P-5'-P-CCNC: 7 U/L (ref 7–45)
ANION GAP SERPL CALC-SCNC: 13 MMOL/L (ref 10–20)
AST SERPL W P-5'-P-CCNC: 21 U/L (ref 9–39)
BILIRUB SERPL-MCNC: 1.3 MG/DL (ref 0–1.2)
BUN SERPL-MCNC: 9 MG/DL (ref 6–23)
CALCIUM SERPL-MCNC: 9.9 MG/DL (ref 8.6–10.6)
CHLORIDE SERPL-SCNC: 101 MMOL/L (ref 98–107)
CHOLEST SERPL-MCNC: 191 MG/DL (ref 0–199)
CHOLESTEROL/HDL RATIO: 1.9
CO2 SERPL-SCNC: 32 MMOL/L (ref 21–32)
CREAT SERPL-MCNC: 0.64 MG/DL (ref 0.5–1.05)
EGFRCR SERPLBLD CKD-EPI 2021: 88 ML/MIN/1.73M*2
ERYTHROCYTE [DISTWIDTH] IN BLOOD BY AUTOMATED COUNT: 12.3 % (ref 11.5–14.5)
GLUCOSE SERPL-MCNC: 96 MG/DL (ref 74–99)
HCT VFR BLD AUTO: 47 % (ref 36–46)
HDLC SERPL-MCNC: 100.2 MG/DL
HGB BLD-MCNC: 15.1 G/DL (ref 12–16)
LDLC SERPL CALC-MCNC: 74 MG/DL
MCH RBC QN AUTO: 30.1 PG (ref 26–34)
MCHC RBC AUTO-ENTMCNC: 32.1 G/DL (ref 32–36)
MCV RBC AUTO: 94 FL (ref 80–100)
NON HDL CHOLESTEROL: 91 MG/DL (ref 0–149)
NRBC BLD-RTO: 0 /100 WBCS (ref 0–0)
PLATELET # BLD AUTO: 260 X10*3/UL (ref 150–450)
POTASSIUM SERPL-SCNC: 4.8 MMOL/L (ref 3.5–5.3)
PROT SERPL-MCNC: 6.9 G/DL (ref 6.4–8.2)
RBC # BLD AUTO: 5.01 X10*6/UL (ref 4–5.2)
SODIUM SERPL-SCNC: 141 MMOL/L (ref 136–145)
TRIGL SERPL-MCNC: 86 MG/DL (ref 0–149)
VLDL: 17 MG/DL (ref 0–40)
WBC # BLD AUTO: 6.1 X10*3/UL (ref 4.4–11.3)

## 2024-11-13 PROCEDURE — 3074F SYST BP LT 130 MM HG: CPT | Performed by: INTERNAL MEDICINE

## 2024-11-13 PROCEDURE — 80053 COMPREHEN METABOLIC PANEL: CPT

## 2024-11-13 PROCEDURE — G0439 PPPS, SUBSEQ VISIT: HCPCS | Performed by: INTERNAL MEDICINE

## 2024-11-13 PROCEDURE — 1170F FXNL STATUS ASSESSED: CPT | Performed by: INTERNAL MEDICINE

## 2024-11-13 PROCEDURE — 93000 ELECTROCARDIOGRAM COMPLETE: CPT | Performed by: INTERNAL MEDICINE

## 2024-11-13 PROCEDURE — 99397 PER PM REEVAL EST PAT 65+ YR: CPT | Performed by: INTERNAL MEDICINE

## 2024-11-13 PROCEDURE — 99214 OFFICE O/P EST MOD 30 MIN: CPT | Performed by: INTERNAL MEDICINE

## 2024-11-13 PROCEDURE — 1160F RVW MEDS BY RX/DR IN RCRD: CPT | Performed by: INTERNAL MEDICINE

## 2024-11-13 PROCEDURE — 85027 COMPLETE CBC AUTOMATED: CPT

## 2024-11-13 PROCEDURE — 1159F MED LIST DOCD IN RCRD: CPT | Performed by: INTERNAL MEDICINE

## 2024-11-13 PROCEDURE — 80061 LIPID PANEL: CPT

## 2024-11-13 PROCEDURE — 1036F TOBACCO NON-USER: CPT | Performed by: INTERNAL MEDICINE

## 2024-11-13 PROCEDURE — 36415 COLL VENOUS BLD VENIPUNCTURE: CPT

## 2024-11-13 PROCEDURE — 3078F DIAST BP <80 MM HG: CPT | Performed by: INTERNAL MEDICINE

## 2024-11-13 NOTE — PROGRESS NOTES
Subjective   Patient ID: Jessica Easton is a 83 y.o. female who presents for Medicare Annual Wellness Visit Initial.    HPI CPE see updated front sheet no chest pain no shortness of breath no falls is doing okay with exercise class regarding the Parkinson's had her CT scan and follow-up with the oncologist yesterday no side effect with medication bowels okay taking B vitamins now    Past medical history hypertension hyperlipidemia    Medication metoprolol biotin    Allergies noted and unchanged    Social history no tobacco    Family history noted and unchanged    Prevention discussed with no longer having colonoscopies discussed with mammogram she prefers none discussed with vaccinations she will be having the flu shot COVID shot and RSV shot some exercise 1 to 2 mile walk per day for at least 5000 steps some prior blood work reviewed    Depression screen not depressed    Review of Systems    Objective   /75   Pulse 66   Wt 48.5 kg (107 lb)   SpO2 97%   BMI 19.57 kg/m²     Physical Exam vital signs noted alert and oriented x3 NCAT PERRLA EOMI nares without discharge OP benign TM normal bilateral EAC clear bilateral no AC nodes no JVD or bruit no thyromegaly chest clear to auscultation and percussion no wheezing no crackles CV regular rate and rhythm S1-S2 without murmur gallop or rub abdomen soft nontender normal active bowel sounds no rebound or guarding no HSM LS spine normal curvature negative straight leg raise negative logroll negative SI joint tenderness extremities no clubbing cyanosis or edema normal distal pulses DTR 2+ cervical spine some stiffness some tight posterior paraspinal muscles    Assessment/Plan impression General medical examination hypertension other diagnoses  cervicalgia hyperlipidemia Parkinson's disease  Plan check EKG advised on heart continue with medication follow-up with oncology continue to watch diet continue with exercise class and walking drinking enough water care and  safety in the home in the home check comprehensive panel advised on glucose potassium and kidney function as well as liver function check lipid panel advised on cholesterol profile check CBC advised on blood count previous TSH was normal heating pad for the neck Tylenol range of motion exercise and/or physical therapy if needed continue with Parkinson medication taking on time encouraged and recheck 3 months obesity 60 cc 31

## 2024-11-24 ASSESSMENT — PATIENT HEALTH QUESTIONNAIRE - PHQ9
1. LITTLE INTEREST OR PLEASURE IN DOING THINGS: NOT AT ALL
SUM OF ALL RESPONSES TO PHQ9 QUESTIONS 1 AND 2: 0
2. FEELING DOWN, DEPRESSED OR HOPELESS: NOT AT ALL

## 2024-11-24 ASSESSMENT — ACTIVITIES OF DAILY LIVING (ADL)
DOING_HOUSEWORK: INDEPENDENT
DRESSING: INDEPENDENT
BATHING: INDEPENDENT
TAKING_MEDICATION: INDEPENDENT
MANAGING_FINANCES: INDEPENDENT
GROCERY_SHOPPING: INDEPENDENT

## 2024-11-24 ASSESSMENT — ENCOUNTER SYMPTOMS
DEPRESSION: 0
OCCASIONAL FEELINGS OF UNSTEADINESS: 0
LOSS OF SENSATION IN FEET: 0

## 2024-12-11 NOTE — PROGRESS NOTES
"Subjective     Jessica Easton is a 84 y.o. female who presents for the following: Skin Exam.  She notes dry, flaky skin on her face, especially around her nose and between her eyebrows.  She denies any new, changing, or concerning skin lesions since her last visit; no bleeding, itching, or burning lesions.      Review of Systems:  No other skin or systemic complaints other than what is documented elsewhere in the note.    The following portions of the chart were reviewed this encounter and updated as appropriate:       Skin Cancer History  No skin cancer on file.    Specialty Problems    None      Past Dermatologic / Past Relevant Medical History:    - history of AKs  - no h/o skin cancer     Family History:    No family history of melanoma or skin cancer    Social History:    The patient's daughter, Davey, accompanies her and works as a nurse in Urology here at ; she goes by \"Saundra\" and pronounces the \"K\" in her last name    Allergies:  Codeine    Current Medications / CAM's:    Current Outpatient Medications:     Comirnaty 2024-25, 12y up,,PF, 30 mcg/0.3 mL syringe, , Disp: , Rfl:     Fluzone High-Dose Triv 24-25 syringe, , Disp: , Rfl:     biotin 1 mg capsule, Take by mouth., Disp: , Rfl:     CALCIUM ORAL, Take by mouth., Disp: , Rfl:     carbidopa-levodopa (Sinemet)  mg tablet, TAKE 1.5 TABLETS BY MOUTH 4 TIMES A DAY. TAKE DOSES AT 6 AM, 10 AM, 2 PM AND 6 PM., Disp: 540 tablet, Rfl: 1    cholecalciferol (Vitamin D-3) 50 MCG (2000 UT) tablet, Take 1 tablet (2,000 Units) by mouth once daily., Disp: , Rfl:     cyanocobalamin, vitamin B-12, (VITAMIN B-12 ORAL), Take by mouth., Disp: , Rfl:     FIBER, PSYLLIUM HUSK, ORAL, Take by mouth., Disp: , Rfl:     ketoconazole (NIZOral) 2 % cream, Apply twice daily to affected areas of face, Disp: 60 g, Rfl: 11    metoprolol wesley-hydrochlorothiaz (Dutoprol) 25-12.5 mg tablet, Take 2 tablets by mouth once daily. (Patient not taking: Reported on 11/13/2024), Disp: , Rfl: "     metoprolol tartrate (Lopressor) 25 mg tablet, TAKE 1 TABLET BY MOUTH TWICE A DAY, Disp: 180 tablet, Rfl: 1     Objective   Well appearing patient in no apparent distress; mood and affect are within normal limits.    A waist-up examination was performed including scalp, face, eyes, ears, nose, lips, neck, chest, axillae, abdomen, back, and bilateral upper extremities. All findings within normal limits unless otherwise noted below.        Assessment/Plan   1. Neoplasm of uncertain behavior of skin (2)  Left Medial Lower Chest  7 mm dark brown pigmented, asymmetric macule with an asymmetric pigment network and irregular borders           Shave removal    Lesion length (cm):  0.7  Margin per side (cm):  0.2  Lesion diameter (cm):  1.1  Informed consent: discussed and consent obtained    Timeout: patient name, date of birth, surgical site, and procedure verified    Procedure prep:  Patient was prepped and draped  Anesthesia: the lesion was anesthetized in a standard fashion    Anesthetic:  1% lidocaine w/ epinephrine 1-100,000 local infiltration  Instrument used: flexible razor blade    Hemostasis achieved with: aluminum chloride    Outcome: patient tolerated procedure well    Post-procedure details: sterile dressing applied and wound care instructions given    Dressing type: bandage and petrolatum      Staff Communication: Dermatology Local Anesthesia: 1 % Lidocaine / Epinephrine - Amount:0.5ml    Specimen 1 - Dermatopathology- DERM LAB  Differential Diagnosis: DN v melanoma v SK  Check Margins Yes/No?:    Comments:    Dermpath Lab: Routine Histopathology (formalin-fixed tissue)    Left Posterior Distal Arm  8 mm pink, shiny papule           Shave removal    Lesion length (cm):  0.8  Margin per side (cm):  0  Lesion diameter (cm):  0.8  Informed consent: discussed and consent obtained    Timeout: patient name, date of birth, surgical site, and procedure verified    Procedure prep:  Patient was prepped and  draped  Anesthesia: the lesion was anesthetized in a standard fashion    Anesthetic:  1% lidocaine w/ epinephrine 1-100,000 local infiltration  Instrument used: flexible razor blade    Hemostasis achieved with: aluminum chloride    Outcome: patient tolerated procedure well    Post-procedure details: sterile dressing applied and wound care instructions given    Dressing type: bandage and petrolatum      Staff Communication: Dermatology Local Anesthesia: 1 % Lidocaine / Epinephrine - Amount:0.5ml    Specimen 2 - Dermatopathology- DERM LAB  Differential Diagnosis: BLK v AK v BCC  Check Margins Yes/No?:    Comments:    Dermpath Lab: Routine Histopathology (formalin-fixed tissue)    2. Actinic keratosis (9)  Head - Anterior (Face) (9)  Scattered on the patient's face, there are 9 erythematous, gritty, scaly macules     Actinic Keratoses -scattered on face.  The pre-cancerous nature of these lesions and treatment options were discussed with the patient today.  At this time, I recommend treatment with liquid nitrogen cryotherapy.  The patient expressed understanding, is in agreement with this plan, and wishes to proceed with cryotherapy today.    Destr of lesion - Head - Anterior (Face) (9)  Complexity: simple    Destruction method: cryotherapy    Informed consent: discussed and consent obtained    Lesion destroyed using liquid nitrogen: Yes    Cryotherapy cycles:  1  Outcome: patient tolerated procedure well with no complications    Post-procedure details: wound care instructions given      3. Melanocytic nevus of trunk  Scattered on the patient's face, neck, trunk, and bilateral upper extremities, there are several small, round- to oval-shaped, brown-pigmented and pink-colored, symmetric, uniform-appearing macules and dome-shaped papules    Clinically benign- to slightly atypical-appearing nevi - the clinically benign- to slightly atypical-appearing nature of the remainder of the patient's nevi was discussed with the  patient today.  None of the patient's nevi, with the exception of the one noted above, meet threshold for biopsy today.  I emphasized the importance of performing monthly self-skin exams using the ABCDs of monitoring moles, which were reviewed with the patient today and an informational hand-out provided.  I also emphasized the importance of sun avoidance and sun protection with daily sunscreen use.    4. Seborrheic keratosis  Scattered on the patient's face, neck, trunk, and bilateral upper extremities, there are multiple tan- to light brown-colored, hyperkeratotic, stuck-on appearing papules of varying size and shape    Seborrheic Keratoses - the benign nature of these lesions was discussed with the patient today and reassurance provided.  No treatment is medically indicated for these lesions at this time.    5. Hemangioma of skin  Scattered on the patient's face, neck, trunk, and bilateral upper extremities, there are multiple small, round, cherry red- to purplish-colored, symmetric, uniform, vascular-appearing macules and papules    Cherry Angiomas - the benign nature of these vascular lesions was discussed with the patient today and reassurance provided.  No treatment is medically indicated for these lesions at this time.    6. Seborrheic dermatitis  Head - Anterior (Face)  On the patient's face, mainly the glabella and bilateral eyebrows and perinasal creases, there are pink, scaly patches with whitish-yellowish, greasy scale    Seborrheic Dermatitis - flare on face.  The potentially chronic and intermittently flaring nature of this condition and treatment options were discussed extensively with the patient today.  At this time, I recommend topical anti-fungal therapy with Ketoconazole 2% cream, which the patient was instructed to apply twice daily to the affected areas of the face.  The risks, benefits, and side effects of this medication were discussed.  The patient expressed understanding and is in  agreement with this plan.    ketoconazole (NIZOral) 2 % cream - Head - Anterior (Face)  Apply twice daily to affected areas of face    7. History of actinic keratoses  There is evidence of photodamage in sun-exposed areas.    History of actinic keratoses and photodamage.  The signs and symptoms of skin cancer were reviewed and the patient was advised to practice sun protection and sun avoidance, use daily sunscreen, and perform regular self skin exams.  I will have the patient return to our office in 1 year, pending the above biopsy results, for routine follow-up and skin exam, and the patient was instructed to call our office should the patient notice any new, changing, symptomatic, or otherwise concerning skin lesions before then.  The patient expressed understanding and is in agreement with this plan.    8. Diffuse photodamage of skin  Photodistributed  Diffuse photodamage with actinic changes with telangiectasia and mottled pigmentation in sun-exposed areas.    Photodamage.  The signs and symptoms of skin cancer were reviewed and the patient was advised to practice sun protection and sun avoidance, use daily sunscreen, and perform regular self skin exams.  Sun protection was discussed, including avoiding the mid-day sun, wearing a sunscreen with SPF at least 50, and stressing the need for reapplication of sunscreen and applying more than they think they need.

## 2024-12-12 ENCOUNTER — APPOINTMENT (OUTPATIENT)
Dept: DERMATOLOGY | Facility: CLINIC | Age: 84
End: 2024-12-12
Payer: MEDICARE

## 2024-12-12 DIAGNOSIS — L21.9 SEBORRHEIC DERMATITIS: ICD-10-CM

## 2024-12-12 DIAGNOSIS — L82.1 SEBORRHEIC KERATOSIS: ICD-10-CM

## 2024-12-12 DIAGNOSIS — Z87.2 HISTORY OF ACTINIC KERATOSES: ICD-10-CM

## 2024-12-12 DIAGNOSIS — D18.01 HEMANGIOMA OF SKIN: ICD-10-CM

## 2024-12-12 DIAGNOSIS — D48.5 NEOPLASM OF UNCERTAIN BEHAVIOR OF SKIN: Primary | ICD-10-CM

## 2024-12-12 DIAGNOSIS — L57.8 DIFFUSE PHOTODAMAGE OF SKIN: ICD-10-CM

## 2024-12-12 DIAGNOSIS — L57.0 ACTINIC KERATOSIS: ICD-10-CM

## 2024-12-12 DIAGNOSIS — D22.5 MELANOCYTIC NEVUS OF TRUNK: ICD-10-CM

## 2024-12-12 PROCEDURE — 11302 SHAVE SKIN LESION 1.1-2.0 CM: CPT | Performed by: DERMATOLOGY

## 2024-12-12 PROCEDURE — 99214 OFFICE O/P EST MOD 30 MIN: CPT | Performed by: DERMATOLOGY

## 2024-12-12 PROCEDURE — 17000 DESTRUCT PREMALG LESION: CPT | Performed by: DERMATOLOGY

## 2024-12-12 PROCEDURE — 17003 DESTRUCT PREMALG LES 2-14: CPT | Performed by: DERMATOLOGY

## 2024-12-12 PROCEDURE — 11301 SHAVE SKIN LESION 0.6-1.0 CM: CPT | Performed by: DERMATOLOGY

## 2024-12-12 RX ORDER — COVID-19 VACCINE, MRNA 0.04 MG/.418ML
INJECTION, SUSPENSION INTRAMUSCULAR
COMMUNITY
Start: 2024-09-18

## 2024-12-12 RX ORDER — KETOCONAZOLE 20 MG/G
CREAM TOPICAL
Qty: 60 G | Refills: 11 | Status: SHIPPED | OUTPATIENT
Start: 2024-12-12

## 2024-12-12 RX ORDER — INFLUENZA A VIRUS A/VICTORIA/4897/2022 IVR-238 (H1N1) ANTIGEN (FORMALDEHYDE INACTIVATED), INFLUENZA A VIRUS A/CALIFORNIA/122/2022 SAN-022 (H3N2) ANTIGEN (FORMALDEHYDE INACTIVATED), AND INFLUENZA B VIRUS B/MICHIGAN/01/2021 ANTIGEN (FORMALDEHYDE INACTIVATED) 60; 60; 60 UG/.5ML; UG/.5ML; UG/.5ML
INJECTION, SUSPENSION INTRAMUSCULAR
COMMUNITY
Start: 2024-09-18

## 2024-12-12 ASSESSMENT — DERMATOLOGY PATIENT ASSESSMENT
DO YOU HAVE IRREGULAR MENSTRUAL CYCLES: NO
DO YOU USE A TANNING BED: NO
WHERE ARE THESE NEW OR CHANGING LESIONS LOCATED: BACK
ARE YOU TRYING TO GET PREGNANT: NO
DO YOU HAVE ANY NEW OR CHANGING LESIONS: YES

## 2024-12-12 ASSESSMENT — DERMATOLOGY QUALITY OF LIFE (QOL) ASSESSMENT: ARE THERE EXCLUSIONS OR EXCEPTIONS FOR THE QUALITY OF LIFE ASSESSMENT: NO

## 2024-12-18 DIAGNOSIS — I10 ESSENTIAL (PRIMARY) HYPERTENSION: ICD-10-CM

## 2024-12-18 LAB
LAB AP ASR DISCLAIMER: NORMAL
LABORATORY COMMENT REPORT: NORMAL
PATH REPORT.FINAL DX SPEC: NORMAL
PATH REPORT.GROSS SPEC: NORMAL
PATH REPORT.MICROSCOPIC SPEC OTHER STN: NORMAL
PATH REPORT.RELEVANT HX SPEC: NORMAL
PATH REPORT.TOTAL CANCER: NORMAL
PATHOLOGY SYNOPTIC REPORT: NORMAL

## 2024-12-19 DIAGNOSIS — C43.9 MELANOMA OF SKIN (MULTI): ICD-10-CM

## 2024-12-19 NOTE — TUMOR BOARD NOTE
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    Recommendation:  WLE with 1 cm margins vs. Mohs surgery.    Review Multidisciplinary Cutaneous Oncology Conference recommendation with patient.  Continue routine follow up and total body skin exams with Wisam Champagne.    Follow Up:  Wisam Champagne, Derm Surgery.      History and Physical Exam  Dermatologic History:   84 y.o. 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      Past Medical History:    Past Medical History:   Diagnosis Date    Essential (primary) hypertension 11/20/2014    Benign essential hypertension    Unspecified atrial fibrillation (Multi) 08/20/2013    Atrial fibrillation             Pathology  Dermatopathology- DERM LAB: [id number]  Order: [id number]   Collected 12/12/2024 13:48       Status: Final result       Visible to patient: Yes (not seen)       Dx: Neoplasm of uncertain behavior of skin    0 Result Notes      Component    FINAL DIAGNOSIS   A. SKIN, LEFT MEDIAL LOWER CHEST, SHAVE EXCISION:  ATYPICAL MELANOCYTIC HYPERPLASIA CONCERNING FOR MELANOMA IN SITU OF THE LENTIGO MALIGNA TYPE, SEE NOTE.     Note:  Microscopic examination reveals a specimen that extends into the superficial dermis.  There is moderate solar elastosis, and there is an area of moderate basal layer melanin pigmentation with an asymmetric proliferation of nested and single melanocytes along the dermal-epidermal junction.  These melanocytes have mildly enlarged nuclei with mild cytoplasm.  A SOX-10 stain  reveals a significant increase in single melanocytes along the dermal-epidermal junction.  Approximately 80 percent of the melanocytes stain with antibodies against PRAME.  All control slides stain appropriately.        Case Summary Report   MELANOMA OF THE SKIN: Biopsy   8th Edition - Protocol posted: 3/23/2022MELANOMA OF THE SKIN: BIOPSY - A  SPECIMEN   Procedure  Biopsy, shave   Specimen Laterality  Left   TUMOR   Tumor Site  Skin of trunk: Left Medial Lower Chest        Histologic Type  Melanoma in situ, lentigo maligna type   Ulceration  Not identified   Tumor Regression  Not identified   MARGINS     Margin Status for Melanoma in Situ  All margins negative for melanoma in situ   Distance from Melanoma in Situ to Peripheral Margin  Approximately 0.1 mm from the deep margin.   PATHOLOGIC STAGE CLASSIFICATION (pTNM, AJCC 8th Edition)     pT Category  pTis                    Radiology

## 2024-12-20 RX ORDER — METOPROLOL TARTRATE 25 MG/1
25 TABLET, FILM COATED ORAL 2 TIMES DAILY
Qty: 180 TABLET | Refills: 1 | Status: SHIPPED | OUTPATIENT
Start: 2024-12-20

## 2025-01-06 ENCOUNTER — TUMOR BOARD CONFERENCE (OUTPATIENT)
Dept: HEMATOLOGY/ONCOLOGY | Facility: HOSPITAL | Age: 85
End: 2025-01-06
Payer: MEDICARE

## 2025-01-14 ENCOUNTER — APPOINTMENT (OUTPATIENT)
Dept: NEUROLOGY | Facility: CLINIC | Age: 85
End: 2025-01-14
Payer: MEDICARE

## 2025-01-14 VITALS
WEIGHT: 107 LBS | BODY MASS INDEX: 19.69 KG/M2 | HEIGHT: 62 IN | DIASTOLIC BLOOD PRESSURE: 76 MMHG | HEART RATE: 81 BPM | SYSTOLIC BLOOD PRESSURE: 177 MMHG

## 2025-01-14 DIAGNOSIS — G20.B1 PARKINSON'S DISEASE WITH DYSKINESIA WITHOUT FLUCTUATING MANIFESTATIONS: Primary | ICD-10-CM

## 2025-01-14 PROCEDURE — 99214 OFFICE O/P EST MOD 30 MIN: CPT | Performed by: PSYCHIATRY & NEUROLOGY

## 2025-01-14 PROCEDURE — 1160F RVW MEDS BY RX/DR IN RCRD: CPT | Performed by: PSYCHIATRY & NEUROLOGY

## 2025-01-14 PROCEDURE — 1159F MED LIST DOCD IN RCRD: CPT | Performed by: PSYCHIATRY & NEUROLOGY

## 2025-01-14 PROCEDURE — 1036F TOBACCO NON-USER: CPT | Performed by: PSYCHIATRY & NEUROLOGY

## 2025-01-14 NOTE — PROGRESS NOTES
Subjective     Jessica Easton is a 84 y.o. year old female seen in follow-up for Parkinson's disease.    HPI    84-year-old right-handed woman with past medical history significant for hypertension, atrial fibrillation, right upper lobe lung cancer diagnosed 2008 status post wedge resection, osteopenia, chronic insomnia, hysterectomy, remote former smoking.     I evaluated her initially on 7/25/2022. As detailed in my ambulatory EMR note from that date she presented with complaint of a tremor starting in the left hand and subsequently spreading to other limbs, noted for more than a year. 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 She exhibited rest tremor of left more than right limbs and also bradykinesia and hypokinesia of left more than right limbs as well as reduced blink rate, mildly micrographic handwriting and dexterity complaints. Gait was unremarkable. Head CT was without features of hydrocephalus or significant vascular burden. I discussed the option of a Sinemet trial and she was interested in pursuing this.     I evaluated her again on 11/15/2022. She reported a gratifying response to Sinemet with improvement in coordination, handwriting, and dexterity. She was tolerating 1 tablet of 25/100 mg immediate release 3 times daily quite well. I discussed the option of an increase to 1.5 tablets 3 times daily and she was interested in proceeding with this.     I evaluated her again on 6/7/2023.  As detailed in my ambulatory EMR note from that date she continued to note a gratifying response to Sinemet at a dose of 1.5 tablets 3 times daily.  Despite spacing every 6 hours she was not noting wearing off at that time.    I evaluated her most recently on 4/4/24.  She was noting wearing off of the effect of Sinemet on a regimen of 1.5 tablets 3 times daily.  I recommended increasing to a 4 times daily regimen with spacing of doses every 4 hours.    She is evaluated again today  "in the office, accompanied by her daughter.    She has been taking Sinemet as 1.5 tablets of 25/100 mg immediate release 4 times daily with doses at roughly 6 AM, 10 AM, 2 PM and 6 PM.    She does not perceive wearing off on the present regimen but has begun to be bothered over the past couple of months by dyskinesias, which are quite evident in the office, manifesting as choreoathetotic movement of the head and upper torso.    Mobility is good for arising from chairs and getting out of cars.  She is walking without assistive devices and does not endorse shuffling or freezing.  She indicates no falls.    She exercises daily with walking 2 miles, and attends an exercise class for 45 minutes twice a week.    She denies any definite orthostatic symptoms but sometimes feels vaguely \"off\".  Per her daughter this mainly seems to correspond to anxiety in certain situations such as large crowds.    She went to the Parkinson boot camp recently and found it helpful.    She endorses chronic maintenance insomnia.  She typically gets to sleep okay but then gets up a few hours later to urinate and is not able to get back to sleep.  Occasionally she takes a brief nap during the daytime hours.    She endorses nocturia anywhere from a single to multiple times per night depending on how much fluid she consumes at night.    She indicates reasonable control of constipation with dietary modifications and use of fiber powder twice daily.  Her daughter asks about adding a bit of magnesium at night to help with sleep and bowel function.  She has not tried this yet.      Review of Systems    As per the history of present illness    Patient Active Problem List   Diagnosis    NSCLC of left lung (Multi)     Past Medical History:   Diagnosis Date    Essential (primary) hypertension 11/20/2014    Benign essential hypertension    Unspecified atrial fibrillation (Multi) 08/20/2013    Atrial fibrillation     Past Surgical History:   Procedure " Laterality Date    HYSTERECTOMY  04/24/2015    Hysterectomy    LUNG SURGERY  12/07/2021    Lung Surgery    OTHER SURGICAL HISTORY  11/18/2022    Lung wedge resection    OTHER SURGICAL HISTORY  04/24/2015    Wrist Carpectomy    TONSILLECTOMY  10/21/2015    Tonsillectomy     Social History     Tobacco Use    Smoking status: Never    Smokeless tobacco: Never   Substance Use Topics    Alcohol use: Not Currently     family history is not on file.    Current Outpatient Medications:     biotin 1 mg capsule, Take by mouth., Disp: , Rfl:     CALCIUM ORAL, Take by mouth., Disp: , Rfl:     carbidopa-levodopa (Sinemet)  mg tablet, TAKE 1.5 TABLETS BY MOUTH 4 TIMES A DAY. TAKE DOSES AT 6 AM, 10 AM, 2 PM AND 6 PM., Disp: 540 tablet, Rfl: 1    cholecalciferol (Vitamin D-3) 50 MCG (2000 UT) tablet, Take 1 tablet (2,000 Units) by mouth once daily., Disp: , Rfl:     Comirnaty 2024-25, 12y up,,PF, 30 mcg/0.3 mL syringe, , Disp: , Rfl:     cyanocobalamin, vitamin B-12, (VITAMIN B-12 ORAL), Take by mouth., Disp: , Rfl:     FIBER, PSYLLIUM HUSK, ORAL, Take by mouth., Disp: , Rfl:     Fluzone High-Dose Triv 24-25 syringe, , Disp: , Rfl:     ketoconazole (NIZOral) 2 % cream, Apply twice daily to affected areas of face, Disp: 60 g, Rfl: 11    metoprolol wesley-hydrochlorothiaz (Dutoprol) 25-12.5 mg tablet, Take 2 tablets by mouth once daily. (Patient not taking: Reported on 11/13/2024), Disp: , Rfl:     metoprolol tartrate (Lopressor) 25 mg tablet, TAKE 1 TABLET BY MOUTH TWICE A DAY, Disp: 180 tablet, Rfl: 1  Allergies   Allergen Reactions    Codeine Nausea/vomiting       Objective   Neurological Exam  Physical Exam    Physical Examination:    General: Alert woman who was ambulatory without assistive devices.  She was evaluated about 45 minutes after a dose of Sinemet.    Mental Status: Clear sensorium without fluctuation.  Appropriate in conversation.  Fluent unremarkable speech without paraphasic errors or hesitancy.    Cranial Nerves:  No gaze deviation or ptosis.  Symmetric facial motor function.  Mildly hard of hearing in conversation.  No dysarthria or dysphonia.    Motor: Muscle tone was normal throughout.  Confrontation strength was symmetrically 5/5 throughout.  She easily stood from the office chair with arms crossed over her chest.    Coordination: Moderate dyskinesia of head and upper torso throughout the encounter.  Symmetric rapid repetitive hand movements.  Repetitive foot taps slower and lower amplitude on the left.  No rest tremor.    Station: Intact and stable.    Gait: Stable and unremarkable except for dyskinetic head movements.  Smooth natural stride without shuffling, freezing or festination.  She moved at a rapid pace.  No significant postural flexion.      Assessment/Plan     After increasing Sinemet to a 4 times daily regimen she has noted dyskinesias, which are quite evident in the office today.    I discussed with her and her daughter a couple of strategies for addressing dyskinesias.  The simplest is to reduce the levodopa burden.  Another option would be to add amantadine, which however I discussed does carry the potential to cause confusion.    I suggested we start by reducing Sinemet to 1 tablet 4 times daily.  I asked her daughter to contact me via [employer] message within a week to 10 days of this adjustment.  If she does well on the lower dose with reduction or resolution of dyskinesia and no significant worsening of mobility/dexterity, then I will refill Sinemet for the lower dose of 1 tablet 4 times daily.    If she worsens from the standpoint of mobility and/or does not note significant improvement in dyskinesia with reduction of Sinemet, then I would consider adding a low dose of amantadine.    I additionally recommended a movement disorders subspecialty consultation for which I provided a referral.    She has noted some neck discomfort recently and I discussed that this might be exacerbated by the dyskinesias.   Hopefully will improve with reduction in the dose of Sinemet.  In the meantime I discussed use of a hot pack and acetaminophen.    Her daughter will have her try 100-200 mg of magnesium at bedtime both for bowel function and for sleep.

## 2025-01-14 NOTE — PATIENT INSTRUCTIONS
As we discussed, the involuntary head and truncal movements that you have been noting are dyskinesias, due to levodopa rather than due to Parkinson's disease itself.    I recommend reducing carbidopa/levodopa to ONE tablet 4 times a day.    Contact my office via [employer] message within the next 7-10 days regarding your response to the reduced dose.  If the dyskinesias improve but your mobility worsens significantly, then we will increase back to 1.5 tablets 4 times a day and try a low dose of a medication called amantadine.    Once we see how you do at the reduced dose of carbidopa/levodopa, we can decide about what dose to send in for your refills.    I am providing a referral to movement disorders neurology for a subspecialty opinion on further management.

## 2025-01-20 ENCOUNTER — APPOINTMENT (OUTPATIENT)
Dept: DERMATOLOGY | Facility: CLINIC | Age: 85
End: 2025-01-20
Payer: MEDICARE

## 2025-01-20 DIAGNOSIS — C43.59: ICD-10-CM

## 2025-01-20 PROCEDURE — 99214 OFFICE O/P EST MOD 30 MIN: CPT | Performed by: DERMATOLOGY

## 2025-01-20 PROCEDURE — 12032 INTMD RPR S/A/T/EXT 2.6-7.5: CPT | Performed by: DERMATOLOGY

## 2025-01-20 PROCEDURE — 11603 EXC TR-EXT MAL+MARG 2.1-3 CM: CPT | Performed by: DERMATOLOGY

## 2025-01-20 NOTE — PROGRESS NOTES
Melanoma Excision Consult Note    Date of Surgery: 1/20/2025  Surgeon:  Ira Murray MD  Office Location:  [address] [facility]  [address]  [facility] [address]  Dept: [phone]  Dept Fax: [phone]   Referring Provider: Wisam Champagne MD      Janey Easton is a 84 y.o. female who presents for the following: Excision for melanoma.    According to the patient, the lesion has been present for approximately greater than 1 year at the time of diagnosis.  The lesion is not causing symptoms.  The lesion has not been treated previously.    The patient does not have a pacemaker / defibrillator.  The patient does not have a heart valve / joint replacement.  The patient is not on blood thinners.    The following portions of the chart were reviewed this encounter and updated as appropriate:       Review of Systems:  No other skin or systemic complaints other than what is documented elsewhere in the note.    Medical History:  Clinically relevant history including significant past medical history, review of systems, medications and allergies was reviewed and is documented in Epic.    Objective   Well appearing patient in no apparent distress; mood and affect are within normal limits.  Vital signs: See record.  Noted on the Left Medial Lower Chest  Is a 0.6 x 0.6 cm scar      The patient confirmed the identified site.    Discussion:  The nature of the diagnosis was explained. The lesion is an early melanoma but is likely to have been present for >1 year and is likely to progress without treatment. The multidisciplinary cutaneous oncology tumor board report was reviewed with the patient and surgery is recommended. The patient was informed that based on the depth and lack of ulceration, a sentinel lymph node biopsy is not indicated. However, the melanoma may be upstaged after excision following histopathologic examination, which may require additional treatment.  Warning signs of melanoma were discussed. We recommended that the patient have regular total body skin exams given increased risk of skin cancers. The patient was instructed to use sun protective behaviors including use of broad spectrum sunscreens and sun protective clothing to reduce the risk of skin cancers.     Surgery was recommended and discussed with the patient. The risks, benefits and potential adverse effects were reviewed and the patient voiced understanding. Discussion included but was not limited to the risks of bleeding and infection, likely scar outcome, possible need for revision surgery, cure rate, wound care requirements, activity restrictions and time to heal. Reconstruction options, risks and benefits were reviewed including second intention healing and linear repair (4-1 ratio was explained). It was explained that the scar would be longer than the original lesion.    Medical Decision Making:    Column 1 - chronic illness with progression  Column 2 - category 3: discussion of management with external physicians (multidisciplinary tumor board)  Column 3 - decision regarding minor surgery with identified risk factors (bleeding, infection, scarring)

## 2025-01-20 NOTE — PROGRESS NOTES
Excision Operative Note    Date of Surgery:  1/20/2025  Surgeon:  Ira Murray MD  Office Location:  [address] [facility]  [address]  [facility] [address]  Dept: [phone]  Dept Fax: [phone]  Referring Provider: Wisam Champagne MD    Janey Easton is a 84 y.o. female who presents for the following: Excision for melanoma.    According to the patient, the lesion has been present for approximately greater than 1 year at the time of diagnosis.  The lesion is not causing symptoms.  The lesion has not been treated previously.    The patient does not have a pacemaker / defibrillator.  The patient does not have a heart valve / joint replacement.    The patient is not on blood thinners.   The patient does not have a history of hepatitis B or C.  The patient does not have a history of HIV.  The patient does have a history of immunosuppression (e.g. organ transplantation, malignancy, medications)    The following portions of the chart were reviewed this encounter and updated as appropriate:         Assessment/Plan   Pre-procedure:   Obtained informed consent: written from patient  The surgical site was identified and confirmed with the patient.     Intra-operative:   Audible time out called at : 2:27 PM 01/20/25  by: Vangie Wright RN   Verified patient name, birthdate, site, specimen bottle label & requisition.    The planned procedure(s) was again reviewed with the patient. The risks of bleeding, infection, nerve damage and scarring were reviewed. The patient identity, surgical site, and planned procedure(s) were verified.     Biopsy Accession Number: [id number]  Melanoma of thoracic region (Multi)  Left Medial Lower Chest    Skin excision    Lesion length (cm):  0.6  Lesion width (cm):  0.6  Margin per side (cm):  1  Total excision diameter (cm):  2.6  Informed consent: discussed and consent obtained    Timeout: patient name, date of birth, surgical site,  and procedure verified    Procedure prep:  Patient was prepped and draped  Anesthesia: the lesion was anesthetized in a standard fashion    Anesthetic:  Lidocaine 2% with epinephrine  Instrument used: #15 blade    Hemostasis achieved with: electrodesiccation    Outcome: patient tolerated procedure well with no complications    Post-procedure details: sterile dressing applied and wound care instructions given    Dressing type: pressure dressing, petrolatum, Hypafix and Telfa pad    Additional details:  Melanoma Terence:   Curative Intent: Yes  Original Breslow Thickness: MIS  Clinical margin width: 1 cm  Depth of excision: Full thickness     Skin repair  Complexity:  Intermediate  Final length (cm):  7.5  Informed consent: discussed and consent obtained    Timeout: patient name, date of birth, surgical site, and procedure verified    Procedure prep:  Patient prepped in sterile fashion  Anesthesia: the lesion was anesthetized in a standard fashion    Anesthetic:  Lidocaine 2% with epinephrine  Reason for type of repair: reduce tension to allow closure    Undermining: edges undermined    Subcutaneous layers (deep stitches):   Suture size:  3-0  Suture type: Vicryl (polyglactin 910)    Stitches:  Buried vertical mattress  Fine/surface layer approximation (top stitches):   Suture size:  3-0  Suture type: Prolene (polypropylene)    Stitches: simple running    Hemostasis achieved with: electrodesiccation  Outcome: patient tolerated procedure well with no complications    Post-procedure details: sterile dressing applied and wound care instructions given    Dressing type: pressure dressing      Specimen 1 - Dermatopathology- DERM LAB  Differential Diagnosis: MIS  Check Margins Yes  Comments:    Dermpath Lab: Routine Histopathology (formalin-fixed tissue)      Intermediate Linear Repair:  Given the location and size of the defect, it was determined that an intermediate layered linear closure was required to restore normal anatomy  and function. The repair is an intermediate closure as two layers of sutures were required. The defect was undermined extensively at the level of the subcutaneous plane. Standing cutaneous cones were removed using Burow's triangles. The wound edges were brought into close approximation with buried vertical mattress sutures. The remainder of the wound was then closed with epidermal top sutures.    The final repair measured 7.5 cm    Wound care was discussed, and the patient was given written post-operative wound care instructions.      The patient will follow up with Ira Murray MD as needed for any post operative problems or concerns, and will follow up with their primary dermatologist as scheduled.

## 2025-01-22 LAB
LABORATORY COMMENT REPORT: NORMAL
PATH REPORT.FINAL DX SPEC: NORMAL
PATH REPORT.GROSS SPEC: NORMAL
PATH REPORT.MICROSCOPIC SPEC OTHER STN: NORMAL
PATH REPORT.RELEVANT HX SPEC: NORMAL
PATH REPORT.TOTAL CANCER: NORMAL

## 2025-01-24 ENCOUNTER — TELEPHONE (OUTPATIENT)
Dept: DERMATOLOGY | Facility: CLINIC | Age: 85
End: 2025-01-24
Payer: MEDICARE

## 2025-01-24 NOTE — TELEPHONE ENCOUNTER
Surgical site: Left Medial Lower Chest  Date of procedure 1/20/25    A voicemail was left for the patient about the pathology result which showed clear margins. No further surgery needed but the patient was advised to follow up with general dermatology. The patient was advised to call with any questions.

## 2025-01-29 ENCOUNTER — TELEPHONE (OUTPATIENT)
Dept: DERMATOLOGY | Facility: CLINIC | Age: 85
End: 2025-01-29
Payer: MEDICARE

## 2025-01-29 NOTE — TELEPHONE ENCOUNTER
Pt called with concerns about redness to the surgical site done by Dr. Murray, pt denies pain, warmth, odor and/or drainage. Dr. Murray reviewed photos, advised that it was a reaction from the sutures. Offered for pt to come in for suture removal Friday. Pt will keep her appt with Dr. Champagne 2/3/25 to have the sutures removed, will call our office if she decides to come in Friday.

## 2025-01-31 ENCOUNTER — APPOINTMENT (OUTPATIENT)
Dept: NEUROLOGY | Facility: CLINIC | Age: 85
End: 2025-01-31
Payer: MEDICARE

## 2025-02-03 ENCOUNTER — APPOINTMENT (OUTPATIENT)
Dept: DERMATOLOGY | Facility: CLINIC | Age: 85
End: 2025-02-03
Payer: MEDICARE

## 2025-02-03 DIAGNOSIS — L90.5 SCAR CONDITIONS AND FIBROSIS OF SKIN: ICD-10-CM

## 2025-02-03 DIAGNOSIS — L81.4 LENTIGO: ICD-10-CM

## 2025-02-03 DIAGNOSIS — L57.8 DIFFUSE PHOTODAMAGE OF SKIN: ICD-10-CM

## 2025-02-03 DIAGNOSIS — D09.9 SQUAMOUS CELL CARCINOMA IN SITU: Primary | ICD-10-CM

## 2025-02-03 DIAGNOSIS — L82.1 SEBORRHEIC KERATOSIS: ICD-10-CM

## 2025-02-03 PROCEDURE — 17263 DSTRJ MAL LES T/A/L 2.1-3.0: CPT | Performed by: DERMATOLOGY

## 2025-02-03 PROCEDURE — 99213 OFFICE O/P EST LOW 20 MIN: CPT | Performed by: DERMATOLOGY

## 2025-02-03 ASSESSMENT — DERMATOLOGY PATIENT ASSESSMENT
ARE YOU TRYING TO GET PREGNANT: NO
ARE YOU ON BIRTH CONTROL: NO
DO YOU HAVE IRREGULAR MENSTRUAL CYCLES: NO
DO YOU HAVE ANY NEW OR CHANGING LESIONS: NO
DO YOU USE A TANNING BED: NO

## 2025-02-03 ASSESSMENT — DERMATOLOGY QUALITY OF LIFE (QOL) ASSESSMENT: ARE THERE EXCLUSIONS OR EXCEPTIONS FOR THE QUALITY OF LIFE ASSESSMENT: NO

## 2025-02-03 NOTE — PROGRESS NOTES
Subjective     Jessica Easton is a 84 y.o. female who presents for the following: Discuss recent biopsy results and management options.  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    Today, the patient states the biopsy sites healed well.  Since her last visit, she recently underwent wide local excision of the melanoma in situ on her left medial lower chest by Dr. Murray on 1/20/25, and the patient and her daughter state the site is healing well, but they would like to have it evaluated today.  She denies any new, changing, or concerning skin lesions since her last visit; no bleeding, itching, or burning lesions.      Review of Systems:  No other skin or systemic complaints other than what is documented elsewhere in the note.    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 Murray on 12/12/24  - recent biopsy-proven SCC in situ on left posterior distal arm diagnosed at last visit on 12/12/24 and pending definitive treatment  - AKs  - seborrheic dermatitis    Family History:    No family history of melanoma or skin cancer    Social History:    The patient's daughter, Davey, accompanies her and works as a nurse in Urology here at ,  "but will be retiring this week; she goes by \"Saundra\" and pronounces the \"K\" in her last name    Allergies:  Codeine    Current Medications / CAM's:    Current Outpatient Medications:     biotin 1 mg capsule, Take by mouth., Disp: , Rfl:     CALCIUM ORAL, Take by mouth., Disp: , Rfl:     carbidopa-levodopa (Sinemet)  mg tablet, Take 1 tablet by mouth 4 times a day. Take doses at 6 AM, 10 AM, 2 PM and 6 PM., Disp: 360 tablet, Rfl: 3    cholecalciferol (Vitamin D-3) 50 MCG (2000 UT) tablet, Take 1 tablet (2,000 Units) by mouth once daily., Disp: , Rfl:     Comirnaty 2024-25, 12y up,,PF, 30 mcg/0.3 mL syringe, , Disp: , Rfl:     cyanocobalamin, vitamin B-12, (VITAMIN B-12 ORAL), Take by mouth., Disp: , Rfl:     FIBER, PSYLLIUM HUSK, ORAL, Take by mouth., Disp: , Rfl:     Fluzone High-Dose Triv 24-25 syringe, , Disp: , Rfl:     ketoconazole (NIZOral) 2 % cream, Apply twice daily to affected areas of face, Disp: 60 g, Rfl: 11    metoprolol wesley-hydrochlorothiaz (Dutoprol) 25-12.5 mg tablet, Take 2 tablets by mouth once daily. (Patient not taking: Reported on 11/13/2024), Disp: , Rfl:     metoprolol tartrate (Lopressor) 25 mg tablet, TAKE 1 TABLET BY MOUTH TWICE A DAY, Disp: 180 tablet, Rfl: 1     Objective   Well appearing patient in no apparent distress; mood and affect are within normal limits.    A skin examination was performed including: Face, neck, chest, and bilateral upper extremities. All findings within normal limits unless otherwise noted below.    Assessment/Plan   1. Squamous cell carcinoma in situ  Left posterior distal arm  Pink, well-healed scar at her recent biopsy site    Destr of lesion    Destruction method: electrodesiccation and curettage    Informed consent: discussed and consent obtained    Timeout:  patient name, date of birth, surgical site, and procedure verified  Procedure prep:  Patient was prepped and draped  Anesthesia: the lesion was anesthetized in a standard fashion    Anesthetic:  1% " lidocaine w/ epinephrine 1-100,000 local infiltration  Curettage performed in three different directions: Yes    Electrodesiccation performed over the curetted area: Yes    Curettage cycles:  3  Lesion length (cm):  1.2  Lesion width (cm):  1.2  Margin per side (cm):  0.6  Final wound size (cm):  2.4  Hemostasis achieved with:  electrodesiccation  Outcome: patient tolerated procedure well with no complications    Post-procedure details: sterile dressing applied and wound care instructions given    Dressing type: bandage and petrolatum      Staff Communication: Dermatology Local Anesthesia: 1 % Lidocaine / Epinephrine - Amount:1ml    Biopsy-proven squamous cell carcinoma in situ - left posterior distal arm; diagnosed at last visit on 12/12/24 and pending definitive treatment.  The malignant nature of SCC in situ, the need for further definitive treatment, and treatment options, including Mohs surgery, wide local excision, and Electrodesiccation & Curettage, were discussed extensively with the patient today.  After discussing the risks and benefits of each option, including the differences in cure rates and permanent scar results, the patient expressed understanding and wishes to proceed with definitive treatment with ED&C today.  I will have the patient return to our office in 3 months for re-evaluation of the ED&C site as well as routine follow-up and skin exam, and the patient was instructed to call should they notice any new, changing, symptomatic, or concerning skin lesions before then.  The patient expressed understanding, is in agreement with this plan, and wishes to proceed with the ED&C today.    2. Scar conditions and fibrosis of skin  Left Breast  On her left medial lower chest, there is a pink to slightly erythematous, linear, well-healed scar at her recent excision site with no evidence of recurrent growth or pigmentation on exam today    Well-healed surgical scar - left medial lower chest.  The benign  nature of this lesion was discussed with the patient and her daughter today and reassurance provided.  We reviewed conservative wound care instructions, including daily bandage changes with Vaseline for another 1 to 2 weeks.  The patient and her daughter expressed understanding and are in agreement with this plan.  Of note, her sutures were removed in the office today as well.    3. Lentigo  Photodistributed  Multiple tan- to light brown-colored, round- to oval-shaped, symmetric and uniform-appearing macules and small patches consistent with lentigines scattered in sun-exposed areas.    Solar Lentigines and photodamage.  The clinically benign-appearing nature of these lesions and their relation to chronic sun exposure were discussed with the patient today and reassurance provided.  None of these lesions meet threshold for biopsy today, and thus no treatment is medically indicated for these lesions at this time.  The signs and symptoms of skin cancer were reviewed and the patient was advised to practice sun protection and sun avoidance, use daily sunscreen, and perform regular self skin exams.  The patient was instructed to monitor these lesions for any changes, such as in size, shape, or color, or associated symptoms and to call our office to schedule a return visit for re-evaluation if any such changes or symptoms are noticed in the future.  The patient expressed understanding and is in agreement with this plan.    4. Seborrheic keratosis  Scattered on the patient's face, neck, chest, and bilateral upper extremities, there are multiple tan- to light brown-colored, hyperkeratotic, stuck-on appearing papules of varying size and shape    Seborrheic Keratoses - the benign nature of these lesions was discussed with the patient today and reassurance provided.  No treatment is medically indicated for these lesions at this time.    5. Diffuse photodamage of skin  Photodistributed  Diffuse photodamage with actinic changes  with telangiectasia and mottled pigmentation in sun-exposed areas.    History of melanoma in situ, lentigo maligna type, squamous cell carcinoma in situ, and actinic keratoses and photodamage.  I emphasized the importance of continuing to perform monthly self-skin and lymph node exams using the ABCDs of monitoring moles, which were reviewed with the patient, as well as the importance of sun avoidance and sun protection with daily sunscreen use.  I will have the patient return to our office in 4 to 6 months for routine melanoma follow-up and total body skin exam, and the patient was instructed to call our office should the patient notice any new, changing, symptomatic, or otherwise concerning skin lesions before then.  The patient and her daughter expressed understanding and are in agreement with this plan.

## 2025-04-15 DIAGNOSIS — I10 ESSENTIAL (PRIMARY) HYPERTENSION: ICD-10-CM

## 2025-04-17 RX ORDER — METOPROLOL TARTRATE 25 MG/1
25 TABLET, FILM COATED ORAL 2 TIMES DAILY
Qty: 180 TABLET | Refills: 1 | Status: SHIPPED | OUTPATIENT
Start: 2025-04-17

## 2025-04-25 DIAGNOSIS — C43.9 MELANOMA OF SKIN (MULTI): ICD-10-CM

## 2025-04-25 NOTE — TUMOR BOARD NOTE
General Patient Information  Name:  Jessica Easton  Evaluation #:  2  Conference Date: 4/28/2025  YOB: 1940  MRN:  [id number]  Program Physician(s):  Dashawn Youssef  Referring Physician(s):  Jovan Barrett(PCP)      Summary   Stage:      Assessment:  Atypical melanocytic hyperplasia of the left medial lower chest with concern for a melanoma in situ, lentigo maligna type. S/p WLE with 1 cm margins. Adequately surgically treated.    Recommendation:  Annual H&P.    Review Multidisciplinary Cutaneous Oncology Conference recommendation with patient.  Continue routine follow up and total body skin exams with Wisam Champagne.    Follow Up:  Wisam Champagne      History and Physical Exam  Dermatologic History:   84 y.o. 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 She underwent a WLE with 1 cm margins on 1/20/2025 which showed changes consistent with previous procedure, inked margins free in the planes of sections examined without residual atypical melanocytic neoplasm seen.      Past Medical History:    Past Medical History:   Diagnosis Date    Essential (primary) hypertension 11/20/2014    Benign essential hypertension    Unspecified atrial fibrillation (Multi) 08/20/2013    Atrial fibrillation             Pathology  Dermatopathology- DERM LAB: [id number]  Order: [id number]   Collected 1/20/2025 13:58       Status: Final result       Dx: Melanoma of thoracic region (Multi)    Test Result Released: Yes (seen)       Messages: Seen    1 Result Note       1 Patient Communication      Component    FINAL DIAGNOSIS   SKIN, LEFT MEDIAL LOWER CHEST, EXCISION:  CHANGES CONSISTENT WITH PREVIOUS PROCEDURE, INKED MARGINS FREE IN THE PLANES OF SECTIONS EXAMINED, WITHOUT RESIDUAL ATYPICAL MELANOCYTIC NEOPLASM SEEN.     ** Electronically signed out by Greg Martinez MD **          Dermatopathology-  DERM LAB: [id number]  Order: [id number]   Collected 12/12/2024 13:48       Status: Final result       Visible to patient: Yes (not seen)       Dx: Neoplasm of uncertain behavior of skin    0 Result Notes      Component    FINAL DIAGNOSIS   A. SKIN, LEFT MEDIAL LOWER CHEST, SHAVE EXCISION:  ATYPICAL MELANOCYTIC HYPERPLASIA CONCERNING FOR MELANOMA IN SITU OF THE LENTIGO MALIGNA TYPE, SEE NOTE.     Note:  Microscopic examination reveals a specimen that extends into the superficial dermis.  There is moderate solar elastosis, and there is an area of moderate basal layer melanin pigmentation with an asymmetric proliferation of nested and single melanocytes along the dermal-epidermal junction.  These melanocytes have mildly enlarged nuclei with mild cytoplasm.  A SOX-10 stain reveals a significant increase in single melanocytes along the dermal-epidermal junction.  Approximately 80 percent of the melanocytes stain with antibodies against PRAME.  All control slides stain appropriately.        Case Summary Report   MELANOMA OF THE SKIN: Biopsy   8th Edition - Protocol posted: 3/23/2022MELANOMA OF THE SKIN: BIOPSY - A  SPECIMEN   Procedure  Biopsy, shave   Specimen Laterality  Left   TUMOR   Tumor Site  Skin of trunk: Left Medial Lower Chest        Histologic Type  Melanoma in situ, lentigo maligna type   Ulceration  Not identified   Tumor Regression  Not identified   MARGINS     Margin Status for Melanoma in Situ  All margins negative for melanoma in situ   Distance from Melanoma in Situ to Peripheral Margin  Approximately 0.1 mm from the deep margin.   PATHOLOGIC STAGE CLASSIFICATION (pTNM, AJCC 8th Edition)     pT Category  pTis

## 2025-04-28 ENCOUNTER — TUMOR BOARD CONFERENCE (OUTPATIENT)
Dept: HEMATOLOGY/ONCOLOGY | Facility: HOSPITAL | Age: 85
End: 2025-04-28
Payer: MEDICARE

## 2025-07-16 ENCOUNTER — APPOINTMENT (OUTPATIENT)
Dept: NEUROLOGY | Facility: CLINIC | Age: 85
End: 2025-07-16
Payer: MEDICARE

## 2025-08-04 ENCOUNTER — APPOINTMENT (OUTPATIENT)
Dept: DERMATOLOGY | Facility: CLINIC | Age: 85
End: 2025-08-04
Payer: MEDICARE

## 2025-08-04 DIAGNOSIS — L57.8 DIFFUSE PHOTODAMAGE OF SKIN: ICD-10-CM

## 2025-08-04 DIAGNOSIS — L82.1 SEBORRHEIC KERATOSIS: ICD-10-CM

## 2025-08-04 DIAGNOSIS — Z12.83 SCREENING EXAM FOR SKIN CANCER: ICD-10-CM

## 2025-08-04 DIAGNOSIS — D48.5 NEOPLASM OF UNCERTAIN BEHAVIOR OF SKIN: Primary | ICD-10-CM

## 2025-08-04 DIAGNOSIS — L57.0 ACTINIC KERATOSIS: ICD-10-CM

## 2025-08-04 DIAGNOSIS — L81.4 LENTIGO: ICD-10-CM

## 2025-08-04 DIAGNOSIS — D22.5 MELANOCYTIC NEVUS OF TRUNK: ICD-10-CM

## 2025-08-04 DIAGNOSIS — L21.9 SEBORRHEIC DERMATITIS: ICD-10-CM

## 2025-08-04 DIAGNOSIS — Z85.820 PERSONAL HISTORY OF MALIGNANT MELANOMA OF SKIN: ICD-10-CM

## 2025-08-04 ASSESSMENT — DERMATOLOGY PATIENT ASSESSMENT
ARE YOU ON BIRTH CONTROL: NO
DO YOU USE A TANNING BED: NO
HAVE YOU HAD OR DO YOU HAVE VASCULAR DISEASE: NO
DO YOU HAVE IRREGULAR MENSTRUAL CYCLES: NO
DO YOU HAVE ANY NEW OR CHANGING LESIONS: NO
ARE YOU AN ORGAN TRANSPLANT RECIPIENT: NO
HAVE YOU HAD OR DO YOU HAVE A STAPH INFECTION: NO
ARE YOU TRYING TO GET PREGNANT: NO

## 2025-08-04 ASSESSMENT — DERMATOLOGY QUALITY OF LIFE (QOL) ASSESSMENT
WHAT SINGLE SKIN CONDITION LISTED BELOW IS THE PATIENT ANSWERING THE QUALITY-OF-LIFE ASSESSMENT QUESTIONS ABOUT: NONE OF THE ABOVE
RATE HOW BOTHERED YOU ARE BY SYMPTOMS OF YOUR SKIN PROBLEM (EG, ITCHING, STINGING BURNING, HURTING OR SKIN IRRITATION): 0 - NEVER BOTHERED
DATE THE QUALITY-OF-LIFE ASSESSMENT WAS COMPLETED: 67421
ARE THERE EXCLUSIONS OR EXCEPTIONS FOR THE QUALITY OF LIFE ASSESSMENT: NO
RATE HOW BOTHERED YOU ARE BY EFFECTS OF YOUR SKIN PROBLEMS ON YOUR ACTIVITIES (EG, GOING OUT, ACCOMPLISHING WHAT YOU WANT, WORK ACTIVITIES OR YOUR RELATIONSHIPS WITH OTHERS): 0 - NEVER BOTHERED
RATE HOW EMOTIONALLY BOTHERED YOU ARE BY YOUR SKIN PROBLEM (FOR EXAMPLE, WORRY, EMBARRASSMENT, FRUSTRATION): 0 - NEVER BOTHERED

## 2025-08-04 ASSESSMENT — PATIENT GLOBAL ASSESSMENT (PGA): PATIENT GLOBAL ASSESSMENT: PATIENT GLOBAL ASSESSMENT:  1 - CLEAR

## 2025-08-04 ASSESSMENT — ITCH NUMERIC RATING SCALE: HOW SEVERE IS YOUR ITCHING?: 0

## 2025-08-04 NOTE — PROGRESS NOTES
"Subjective     Jessica Easton is a 84 y.o. female who presents for the following: Skin Check.  She notes a brown spot on the right side of her nose, which has been present for a few months and has increased in size.  She also notes dry, flaky patches on her face recently, especially her on her lower forehead and between her eyebrows.  She denies any other new, changing, or concerning skin lesions since her last visit; no bleeding, itching, or burning lesions.      Review of Systems:  No other skin or systemic complaints other than what is documented elsewhere in the note.    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    A full examination was performed including scalp, face, eyes, ears, nose, lips, neck, chest, axillae, abdomen, back, " bilateral upper extremities, and bilateral lower extremities. All findings within normal limits unless otherwise noted below.    Assessment/Plan   Skin Exam  1. NEOPLASM OF UNCERTAIN BEHAVIOR OF SKIN (3)  Right anterior medial proximal leg  1.2 cm pink to erythematous, scaly, thin plaque    - Lesion biopsy  Type of biopsy: tangential    Informed consent: discussed and consent obtained    Timeout: patient name, date of birth, surgical site, and procedure verified    Procedure prep:  Patient was prepped and draped  Anesthesia: the lesion was anesthetized in a standard fashion    Anesthetic:  1% lidocaine w/ epinephrine 1-100,000 local infiltration  Instrument used: flexible razor blade    Hemostasis achieved with: aluminum chloride    Outcome: patient tolerated procedure well    Post-procedure details: wound care instructions given      Specimen 2 - Dermatopathology- DERM LAB  Differential Diagnosis: BCC vs SCC vs SK vs LPLK  Check Margins Yes/No?:  yes  Comments:    Dermpath Lab: Routine Histopathology (formalin-fixed tissue)  Right nasal ala  6 mm brown pigmented, asymmetric macule    - Shave removal    Lesion length (cm):  0.6  Margin per side (cm):  0  Lesion diameter (cm):  0.6  Informed consent: discussed and consent obtained    Timeout: patient name, date of birth, surgical site, and procedure verified    Procedure prep:  Patient was prepped and draped  Anesthesia: the lesion was anesthetized in a standard fashion    Anesthetic:  1% lidocaine w/ epinephrine 1-100,000 local infiltration  Instrument used: flexible razor blade    Hemostasis achieved with: aluminum chloride    Outcome: patient tolerated procedure well    Post-procedure details: sterile dressing applied and wound care instructions given    Dressing type: bandage and petrolatum      Specimen 3 - Dermatopathology- DERM LAB  Differential Diagnosis: pigmented AK vs LM vs lentigo  Check Margins Yes/No?:  yes  Comments:    Dermpath Lab: Routine  Histopathology (formalin-fixed tissue)  Left medial dorsal forearm  1.2 cm dark brown pigmented, asymmetric patch with an asymmetric pigment network and irregular borders     - Shave removal    Lesion length (cm):  1.2  Margin per side (cm):  0  Lesion diameter (cm):  1.2  Informed consent: discussed and consent obtained    Timeout: patient name, date of birth, surgical site, and procedure verified    Procedure prep:  Patient was prepped and draped  Anesthesia: the lesion was anesthetized in a standard fashion    Anesthetic:  1% lidocaine w/ epinephrine 1-100,000 local infiltration  Instrument used: flexible razor blade    Hemostasis achieved with: aluminum chloride    Outcome: patient tolerated procedure well    Post-procedure details: sterile dressing applied and wound care instructions given    Dressing type: bandage and petrolatum      Specimen 1 - Dermatopathology- DERM LAB  Differential Diagnosis: NMSC vs r/o LM vs SK  Check Margins Yes/No?:  yes  Comments:    Dermpath Lab: Routine Histopathology (formalin-fixed tissue)  2. ACTINIC KERATOSIS (4)  Left nasal dorsum, left medial cheek, right medial cheek, right lateral cheek (4)  On her left nasal dorsum, left medial cheek, and scattered on her right temple, there are 4 erythematous, gritty, scaly macules   Actinic Keratoses - left nasal dorsum, left medial cheek, and scattered on right temple.  The pre-cancerous nature of these lesions and treatment options were discussed with the patient today.  At this time, we recommend treatment with liquid nitrogen cryotherapy.  The patient expressed understanding, is in agreement with this plan, and wishes to proceed with cryotherapy today.  - Destr of lesion - Left nasal dorsum, left medial cheek, right medial cheek, right lateral cheek (4)  Complexity: simple    Destruction method: cryotherapy    Informed consent: discussed and consent obtained    Lesion destroyed using liquid nitrogen: Yes    Cryotherapy cycles:   1  Outcome: patient tolerated procedure well with no complications    Post-procedure details: wound care instructions given      3. MELANOCYTIC NEVUS OF TRUNK  Generalized  Scattered on the patient's face, neck, trunk, and extremities, there are several small, round- to oval-shaped, brown-pigmented and pink-colored, symmetric, uniform-appearing macules and dome-shaped papules  Clinically benign- to slightly atypical-appearing nevi - the clinically benign- to slightly atypical-appearing nature of the patient's nevi was discussed with the patient today.  None of the patient's nevi meet threshold for biopsy today.  We emphasized the importance of performing monthly self-skin exams using the ABCDs of monitoring moles, which were reviewed with the patient today and an informational hand-out provided.  We also emphasized the importance of sun avoidance and sun protection with daily sunscreen use.  The patient expressed understanding and is in agreement with this plan.  4. LENTIGO  Photodistributed  Multiple tan- to light brown-colored, round- to oval-shaped, symmetric and uniform-appearing macules and small patches consistent with lentigines scattered in sun-exposed areas.  Solar Lentigines and photodamage.  The clinically benign-appearing nature of these lesions and their relation to chronic sun exposure were discussed with the patient today and reassurance provided.  None of these lesions, with the exception of the one noted above, meet threshold for biopsy today, and thus no treatment is medically indicated for these lesions at this time.  The signs and symptoms of skin cancer were reviewed and the patient was advised to practice sun protection and sun avoidance, use daily sunscreen, and perform regular self skin exams.  The patient was instructed to monitor these lesions for any changes, such as in size, shape, or color, or associated symptoms and to call our office to schedule a return visit for re-evaluation if any  such changes or symptoms are noticed in the future.  The patient expressed understanding and is in agreement with this plan.  5. SEBORRHEIC KERATOSIS  Generalized  Scattered on the patient's face, neck, trunk, and extremities, there are multiple tan- to light brown-colored, hyperkeratotic, stuck-on appearing papules of varying size and shape  Seborrheic Keratoses - the benign nature of these lesions was discussed with the patient today and reassurance provided.  No treatment is medically indicated for these lesions at this time.  6. SEBORRHEIC DERMATITIS  Head - Anterior (Face)  On the patient's face, mainly the glabella and bilateral eyebrows and perinasal creases, there are pink, scaly patches with whitish-yellowish, greasy scale  Seborrheic Dermatitis - flare on face.  The potentially chronic and intermittently flaring nature of this condition and treatment options were discussed extensively with the patient today.  At this time, we recommend topical anti-fungal therapy with Ketoconazole 2% cream, which the patient was instructed to apply twice daily to the affected areas of the face.  The risks, benefits, and side effects of this medication were discussed.  The patient expressed understanding and is in agreement with this plan.  Existing Treatments  - ketoconazole (NIZOral) 2 % cream - Apply twice daily to affected areas of face  7. PERSONAL HISTORY OF MALIGNANT MELANOMA OF SKIN  Left Breast  The sites of prior melanoma excision were examined.  There is no evidence of recurrent growth or pigmentation or recurrent disease, satellite papules, or nodules on exam today.  History of melanoma in situ, lentigo maligna type, squamous cell carcinoma in situ, and actinic keratoses and photodamage.  There is no evidence of local, regional, or distant recurrent disease or any new primary melanomas on exam today.  We emphasized the importance of continuing to perform monthly self-skin and lymph node exams using the ABCDs of  monitoring moles, which were reviewed with the patient, as well as the importance of sun avoidance and sun protection with daily sunscreen use.  We will have the patient return to our office in 4-6 months, pending the above biopsy results, for routine melanoma follow-up and total body skin exam, and the patient was instructed to call our office should the patient notice any new, changing, symptomatic, or otherwise concerning skin lesions before then.  The patient and her daughter expressed understanding and are in agreement with this plan.  This Visit  - Follow Up In Dermatology - Established Patient  8. DIFFUSE PHOTODAMAGE OF SKIN  Photodistributed  Diffuse photodamage with actinic changes with telangiectasia and mottled pigmentation in sun-exposed areas.  Photodamage.  The signs and symptoms of skin cancer were reviewed and the patient was advised to practice sun protection and sun avoidance, use daily sunscreen, and perform regular self skin exams.  Sun protection was discussed, including avoiding the mid-day sun, wearing a sunscreen with SPF at least 50, and stressing the need for reapplication of sunscreen and applying more than they think they need.  9. SCREENING EXAM FOR SKIN CANCER           I saw and evaluated the patient. I personally obtained the key and critical portions of the history and physical exam or was physically present for key and critical portions performed by the resident/fellow. I reviewed the resident/fellow's documentation and discussed the patient with the resident/fellow. I agree with the resident/fellow's medical decision making as documented in the note.    Wisam Champagne MD         [1]   Current Outpatient Medications:     biotin 1 mg capsule, Take by mouth., Disp: , Rfl:     CALCIUM ORAL, Take by mouth., Disp: , Rfl:     cholecalciferol (Vitamin D-3) 50 MCG (2000 UT) tablet, Take 1 tablet (50 mcg) by mouth once daily., Disp: , Rfl:     Comirnaty 2024-25, 12y up,,PF, 30 mcg/0.3 mL  syringe, , Disp: , Rfl:     cyanocobalamin, vitamin B-12, (VITAMIN B-12 ORAL), Take by mouth., Disp: , Rfl:     FIBER, PSYLLIUM HUSK, ORAL, Take by mouth., Disp: , Rfl:     Fluzone High-Dose Triv 24-25 syringe, , Disp: , Rfl:     ketoconazole (NIZOral) 2 % cream, Apply twice daily to affected areas of face, Disp: 60 g, Rfl: 11    metoprolol wesley-hydrochlorothiaz (Dutoprol) 25-12.5 mg tablet, Take 2 tablets by mouth once daily., Disp: , Rfl:     metoprolol tartrate (Lopressor) 25 mg tablet, Take 1 tablet (25 mg) by mouth 2 times a day., Disp: 180 tablet, Rfl: 1    carbidopa-levodopa (Sinemet)  mg tablet, Take 1 tablet by mouth 4 times a day. Take doses at 6 AM, 10 AM, 2 PM and 6 PM., Disp: 360 tablet, Rfl: 3

## 2025-08-04 NOTE — Clinical Note
On the patient's ***, there are well-healed scars with no evidence of recurrent growth on exam today.

## 2025-08-04 NOTE — Clinical Note
History of nonmelanoma skin cancers and actinic keratoses and photodamage.  There is no evidence of recurrence at the sites of the patient's previously treated NMSCs on exam today.  The signs and symptoms of skin cancer were reviewed and the patient was advised to practice sun protection and sun avoidance, use daily sunscreen, and perform regular self skin exams.  We will have the patient return to our office in 6  months  for routine follow-up and skin exam, and the patient was instructed to call our office should the patient notice any new, changing, symptomatic, or otherwise concerning skin lesions before then.  The patient expressed understanding and is in agreement with this plan.

## 2025-08-04 NOTE — Clinical Note
On her left nasal dorsum, left medial cheek, and scattered on her right temple, there are 4 erythematous, gritty, scaly macules

## 2025-08-04 NOTE — Clinical Note
History of melanoma in situ, lentigo maligna type, squamous cell carcinoma in situ, and actinic keratoses and photodamage.  I emphasized the importance of continuing to perform monthly self-skin and lymph node exams using the ABCDs of monitoring moles, which were reviewed with the patient, as well as the importance of sun avoidance and sun protection with daily sunscreen use.  We will have the patient return to our office in 4 to 6 months for routine melanoma follow-up and total body skin exam, and the patient was instructed to call our office should the patient notice any new, changing, symptomatic, or otherwise concerning skin lesions before then.  The patient and her daughter expressed understanding and are in agreement with this plan.

## 2025-08-04 NOTE — Clinical Note
Well-healed surgical scar - left medial lower chest, left posterior distal arm.  The benign nature of this lesion was discussed with the patient and her daughter today and reassurance provided.  We reviewed conservative wound care instructions, including daily bandage changes with Vaseline for another 1 to 2 weeks.  The patient and her daughter expressed understanding and are in agreement with this plan.

## 2025-08-04 NOTE — Clinical Note
History of melanoma in situ, lentigo maligna type, squamous cell carcinoma in situ, and actinic keratoses and photodamage.  There is no evidence of local, regional, or distant recurrent disease or any new primary melanomas on exam today.  We emphasized the importance of continuing to perform monthly self-skin and lymph node exams using the ABCDs of monitoring moles, which were reviewed with the patient, as well as the importance of sun avoidance and sun protection with daily sunscreen use.  We will have the patient return to our office in 4-6 months, pending the above biopsy results, for routine melanoma follow-up and total body skin exam, and the patient was instructed to call our office should the patient notice any new, changing, symptomatic, or otherwise concerning skin lesions before then.  The patient and her daughter expressed understanding and are in agreement with this plan.

## 2025-08-04 NOTE — Clinical Note
History of nonmelanoma skin cancers and actinic keratoses and photodamage.  There is no evidence of recurrence at the sites of the patient's previously treated NMSCs on exam today.  The signs and symptoms of skin cancer were reviewed and the patient was advised to practice sun protection and sun avoidance, use daily sunscreen, and perform regular self skin exams.  I will have the patient return to our office in *** for routine follow-up and skin exam, and the patient was instructed to call our office should the patient notice any new, changing, symptomatic, or otherwise concerning skin lesions before then.  The patient expressed understanding and is in agreement with this plan.

## 2025-08-04 NOTE — Clinical Note
Actinic Keratoses - left nasal dorsum, left medial cheek, and scattered on right temple.  The pre-cancerous nature of these lesions and treatment options were discussed with the patient today.  At this time, we recommend treatment with liquid nitrogen cryotherapy.  The patient expressed understanding, is in agreement with this plan, and wishes to proceed with cryotherapy today.

## 2025-08-04 NOTE — Clinical Note
On the patient's left posterior distal arm and left medial lower chest, there are well-healed scars with no evidence of recurrent growth on exam today.

## 2025-08-04 NOTE — Clinical Note
Seborrheic Dermatitis - flare on face.  The potentially chronic and intermittently flaring nature of this condition and treatment options were discussed extensively with the patient today.  At this time, we recommend topical anti-fungal therapy with Ketoconazole 2% cream, which the patient was instructed to apply twice daily to the affected areas of the face.  The risks, benefits, and side effects of this medication were discussed.  The patient expressed understanding and is in agreement with this plan.

## 2025-08-04 NOTE — Clinical Note
On her left medial lower chest, there is a pink to slightly erythematous, linear, well-healed scar at her excision site with no evidence of recurrent growth or pigmentation on exam today. On the left posterior distal arm, there is a circular, erythematous well-healed scar at previous site of ED&C.

## 2025-08-06 ENCOUNTER — APPOINTMENT (OUTPATIENT)
Dept: NEUROLOGY | Facility: CLINIC | Age: 85
End: 2025-08-06
Payer: MEDICARE

## 2025-08-06 VITALS
DIASTOLIC BLOOD PRESSURE: 77 MMHG | BODY MASS INDEX: 19.57 KG/M2 | RESPIRATION RATE: 16 BRPM | SYSTOLIC BLOOD PRESSURE: 147 MMHG | HEART RATE: 75 BPM | WEIGHT: 107 LBS

## 2025-08-06 DIAGNOSIS — G20.B1 PARKINSON'S DISEASE WITH DYSKINESIA WITHOUT FLUCTUATING MANIFESTATIONS: ICD-10-CM

## 2025-08-06 DIAGNOSIS — M79.10 MYALGIA: Primary | ICD-10-CM

## 2025-08-06 PROCEDURE — 99215 OFFICE O/P EST HI 40 MIN: CPT | Performed by: STUDENT IN AN ORGANIZED HEALTH CARE EDUCATION/TRAINING PROGRAM

## 2025-08-06 PROCEDURE — 1159F MED LIST DOCD IN RCRD: CPT | Performed by: STUDENT IN AN ORGANIZED HEALTH CARE EDUCATION/TRAINING PROGRAM

## 2025-08-06 RX ORDER — CARBIDOPA AND LEVODOPA 25; 100 MG/1; MG/1
1 TABLET ORAL 4 TIMES DAILY
Qty: 360 TABLET | Refills: 3 | Status: SHIPPED | OUTPATIENT
Start: 2025-08-06

## 2025-08-06 ASSESSMENT — PATIENT HEALTH QUESTIONNAIRE - PHQ9
1. LITTLE INTEREST OR PLEASURE IN DOING THINGS: NOT AT ALL
2. FEELING DOWN, DEPRESSED OR HOPELESS: NOT AT ALL
SUM OF ALL RESPONSES TO PHQ9 QUESTIONS 1 AND 2: 0

## 2025-08-06 ASSESSMENT — UNIFIED PARKINSONS DISEASE RATING SCALE (UPDRS)
MINUTES_SINCE_LEVODOPA: 120
FINGER_TAPPING_RIGHT: 2
AMPLITUDE_LUE: 1
AMPLITUDE_RLE: 0
TOTAL_SCORE: 35
AMPLITUDE_LIP_JAW: 0
POSTURAL_TREMOR_RIGHTHAND: 1
SPONTANEITY_OF_MOVEMENT: 1
AMPLITUDE_RUE: 0
SPEECH: 0
PRONATION_SUPINATION_RIGHT: 1
FACIAL_EXPRESSION: 1
LEG_AGILITY_RIGHT: 2
CLINICAL_STATE: ON
RIGIDITY_RLE: 1
POSTURAL_TREMOR_LEFTHAND: 1
AMPLITUDE_LLE: 1
LEVODOPA: YES
GAIT: 1
MOVEMENTS_INTERFERE_WITH_RATINGS: NO
KINETIC_TREMOR_RIGHTHAND: 0
RIGIDITY_RUE: 1
KINETIC_TREMOR_LEFTHAND: 0
POSTURAL_STABILITY: 4
LEG_AGILITY_LEFT: 2
HANDMOVEMENTS_RIGHT: 1
PRONATION_SUPINATION_LEFT: 0
FREEZING_GAIT: 0
RIGIDITY_LLE: 0
TOETAPPING_LEFT: 3
PARKINSONS_MEDS: YES
HOEHN_YAHR: 2
POSTURE: 1
CHAIR_RISING_SCALE: 1
RIGIDITY_NECK: 2
DYSKINESIAS_PRESENT: YES
TOETAPPING_RIGHT: 2
CONSTANCY_TREMOR_ATREST: 2
FINGER_TAPPING_LEFT: 2
RIGIDITY_LUE: 1

## 2025-08-06 ASSESSMENT — ENCOUNTER SYMPTOMS
DEPRESSION: 0
OCCASIONAL FEELINGS OF UNSTEADINESS: 0
LOSS OF SENSATION IN FEET: 0

## 2025-08-06 NOTE — PROGRESS NOTES
Subjective   Jessica Easton is a 84 y.o. year old female with PMHx afib, HTN, lung cancer (dx 2008) s/p RUL wedge resection, osteopenia, chronic insomnia, hysterectomy who presents to establish care for PD.    HPI  Previously followed by Dr. Son; last seen 1/2025. Dyskinesia resolved with 1 TAB QID. Infrequent wearing off shortly before subsequent dose. Patient continues to report poor sleep but declined Sleep Medicine referral. Denied dizziness, recent falls. Denied AVH, cognitive decline, urinary issues, constipation (magnesium glycinate maintains regular bowel movements).     BRIEF HISTORY  Initially evaluated in 7/2022 for tremor starting in the left hand and subsequently spreading to other limbs, noted for more than a year. 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 She exhibited rest tremor of left more than right limbs and also bradykinesia and hypokinesia of left more than right limbs as well as reduced blink rate, mildly micrographic handwriting and dexterity complaints. Gait was unremarkable. Head CT was without features of hydrocephalus or significant vascular burden. She reported good response to Sinemet with improvement in coordination, handwriting, and dexterity. She noted wearing off of the effect of Sinemet on a regimen of 1.5 tablets 3 times daily, for which frequency was increased to 4 times daily regimen with spacing of doses every 4 hours. She subsequently developed dyskinesias, manifesting as choreoathetotic movement of the head and upper torso; reduced Sinemet to 1 TAB QID.    Problem List[1]  Medical History[2]  Surgical History[3]  Social History     Tobacco Use    Smoking status: Never    Smokeless tobacco: Never   Substance Use Topics    Alcohol use: Not Currently     family history is not on file.  Current Medications[4]  Allergies[5]    Objective   Vitals:    08/06/25 0808   BP: 147/77   Pulse: 75   Resp:       Neurological Exam:  MENTAL  STATUS:  General appearance: alert, NAD  Orientation: person, place, date  Language: Expression, repetition, naming, comprehension intact  Follows complex commands across midline    CRANIAL NERVES:  - II:  Visual fields intact to confrontation bilaterally  - III, IV, VI: EOMI to pursuit without nystagmus  - V: V1-V3 sensation intact bilaterally  - VII: Face muscles symmetric with smile and eye closure  - VIII: Intact to voice  - IX, X: Palate elevated symmetrically bilaterally, no hoarseness  - XI: 5/5 strength on shoulder shrugging bilaterally  - XII: Tongue midline without atrophy or fasciculation    MOTOR: Tone and bulk normal in all extremities, mild L laterocollis    STRENGTH: R L  Deltoid  5 5  Biceps  5 5  Triceps  5 5    5 5    Hip flexion 5 5  Quadriceps 5 5  Hamstrings 5 5  DorsiFlex 5          5  PlantarFlex 5 5    REFLEXES:  R  L  Biceps   3+ 3+  Triceps   2+ 2+  Patellar   3+ 3+    COORDINATION: Intact on finger to nose bl, intact on heel to shin bl    SENSORY: Intact to light touch in bl UE and LE    GAIT: mildly stooped, reduced stride length    MDS UPDRS 1st Score:   Motor Examination  Is the patient on medication for treating the symptoms of Parkinson's Disease?: Yes  Patients receiving medication for treating the symptoms of Parkinson's Disease, pepito the patient's clinical state.: On  Is the patient on Levodopa?: Yes  Minutes since last Levodopa dose: 120  Speech: 0  Facial Expression: 1  Rigidty Neck: 2  Rigidty RUE: 1  Rigidity - LUE: 1  Rigidity RLE: 1  Rigidity LLE: 0  Finger Tapping Right Hand: 2  Finger Tapping Left Hand: 2  Hand Movements- Right Hand: 1  Hand Movements- Left Hand: 0  Pronatiaon-Supination Movments - Right Hand: 1  Pronatiaon-Supination Movments Left Hand: 0  Toe Tapping Right Foot: 2  Toe Tapping - Left Foot: 3  Leg Agility - Right Le  Leg Agility - Left le  Arising from Chair: 1  Gait: 1  Freezing of Gait: 0  Postural Stability: 4  Posture: 1  Global  Spontanteity of Movment ( Body Bradykinesia): 1  Postural Tremor - Right Hand: 1  Postural Tremor - Left hand: 1  Kinetic Tremor - Right hand: 0  Kinetic Tremor - Left hand: 0  Rest Tremor Amplitude - RUE: 0  Rest Tremor Amplitude - LUE: 1  Rest Tremor Amplitude - RLE: 0  Rest Tremor Amplitude - LLE: 1  Rest Tremor Amplitude - Lip/Jaw: 0  Constancy of Rest Tremor: 2  MDS UPDRS Total Score: 35  Were dyskinesias (chorea or dystonia) present during examination?: Yes  Did these movements interfere with your rating?: No  Hoen and Yahr Stage: 2      Assessment/Plan   Jessica Easton is a 84 year old female with PMHx afib, HTN, lung cancer (dx 2008) s/p RUL wedge resection, osteopenia, chronic insomnia, hysterectomy who presents to establish care for PD. Exam revealed mild bradykinesia, mild LUE and LLE tremor and mild dyskinesia in neck/trunk--symptoms are not disruptive, thus reasonable to continue current C/L regimen. Patient also reported RUE pain during exam, though strength and sensation remain intact, suggestive of MSK pain.     - continue C/L 1 TAB QID  - obtain serum CBC, RFP, CK for basic work-up of RUE pain  - if negative, reasonable to treat MSK pain per PCP  - follow-up 6 months with José Miguel    Patient seen and discussed with attending physician, Dr. Hernandez.    Yoselyn Tinajero  PGY-5 Movement Disorders Fellow        [1]   Patient Active Problem List  Diagnosis    NSCLC of left lung (Multi)   [2]   Past Medical History:  Diagnosis Date    Essential (primary) hypertension 11/20/2014    Benign essential hypertension    Unspecified atrial fibrillation (Multi) 08/20/2013    Atrial fibrillation   [3]   Past Surgical History:  Procedure Laterality Date    HYSTERECTOMY  04/24/2015    Hysterectomy    LUNG SURGERY  12/07/2021    Lung Surgery    OTHER SURGICAL HISTORY  11/18/2022    Lung wedge resection    OTHER SURGICAL HISTORY  04/24/2015    Wrist Carpectomy    TONSILLECTOMY  10/21/2015    Tonsillectomy   [4]   Current  Outpatient Medications:     biotin 1 mg capsule, Take by mouth., Disp: , Rfl:     CALCIUM ORAL, Take by mouth., Disp: , Rfl:     carbidopa-levodopa (Sinemet)  mg tablet, Take 1 tablet by mouth 4 times a day. Take doses at 6 AM, 10 AM, 2 PM and 6 PM., Disp: 360 tablet, Rfl: 3    cholecalciferol (Vitamin D-3) 50 MCG (2000 UT) tablet, Take 1 tablet (50 mcg) by mouth once daily., Disp: , Rfl:     Comirnaty 2024-25, 12y up,,PF, 30 mcg/0.3 mL syringe, , Disp: , Rfl:     cyanocobalamin, vitamin B-12, (VITAMIN B-12 ORAL), Take by mouth., Disp: , Rfl:     FIBER, PSYLLIUM HUSK, ORAL, Take by mouth., Disp: , Rfl:     Fluzone High-Dose Triv 24-25 syringe, , Disp: , Rfl:     ketoconazole (NIZOral) 2 % cream, Apply twice daily to affected areas of face, Disp: 60 g, Rfl: 11    metoprolol wesley-hydrochlorothiaz (Dutoprol) 25-12.5 mg tablet, Take 2 tablets by mouth once daily., Disp: , Rfl:     metoprolol tartrate (Lopressor) 25 mg tablet, Take 1 tablet (25 mg) by mouth 2 times a day., Disp: 180 tablet, Rfl: 1  [5]   Allergies  Allergen Reactions    Codeine Nausea/vomiting

## 2025-08-06 NOTE — PATIENT INSTRUCTIONS
Ms. Easton,    You were seen for Parkinson's disease. Please continue taking carbidopa/levodopa as prescribed. Please obtain some routine blood work to further evaluate your muscle pain in right arm. Please return to clinic in 6 months.

## 2025-08-07 LAB
ALBUMIN SERPL-MCNC: 4.5 G/DL (ref 3.6–5.1)
BASOPHILS # BLD AUTO: 122 CELLS/UL (ref 0–200)
BASOPHILS NFR BLD AUTO: 1.9 %
BUN SERPL-MCNC: 11 MG/DL (ref 7–25)
BUN/CREAT SERPL: ABNORMAL (CALC) (ref 6–22)
CALCIUM SERPL-MCNC: 10.1 MG/DL (ref 8.6–10.4)
CHLORIDE SERPL-SCNC: 102 MMOL/L (ref 98–110)
CK SERPL-CCNC: 62 U/L (ref 16–215)
CO2 SERPL-SCNC: 32 MMOL/L (ref 20–32)
CREAT SERPL-MCNC: 0.72 MG/DL (ref 0.6–0.95)
EGFRCR SERPLBLD CKD-EPI 2021: 82 ML/MIN/1.73M2
EOSINOPHIL # BLD AUTO: 109 CELLS/UL (ref 15–500)
EOSINOPHIL NFR BLD AUTO: 1.7 %
ERYTHROCYTE [DISTWIDTH] IN BLOOD BY AUTOMATED COUNT: 12.1 % (ref 11–15)
GLUCOSE SERPL-MCNC: 91 MG/DL (ref 65–139)
HCT VFR BLD AUTO: 48 % (ref 35–45)
HGB BLD-MCNC: 15.6 G/DL (ref 11.7–15.5)
LYMPHOCYTES # BLD AUTO: 1267 CELLS/UL (ref 850–3900)
LYMPHOCYTES NFR BLD AUTO: 19.8 %
MCH RBC QN AUTO: 30.8 PG (ref 27–33)
MCHC RBC AUTO-ENTMCNC: 32.5 G/DL (ref 32–36)
MCV RBC AUTO: 94.7 FL (ref 80–100)
MONOCYTES # BLD AUTO: 602 CELLS/UL (ref 200–950)
MONOCYTES NFR BLD AUTO: 9.4 %
NEUTROPHILS # BLD AUTO: 4301 CELLS/UL (ref 1500–7800)
NEUTROPHILS NFR BLD AUTO: 67.2 %
PHOSPHATE SERPL-MCNC: 4.6 MG/DL (ref 2.1–4.3)
PLATELET # BLD AUTO: 253 THOUSAND/UL (ref 140–400)
PMV BLD REES-ECKER: 10.6 FL (ref 7.5–12.5)
POTASSIUM SERPL-SCNC: 5.5 MMOL/L (ref 3.5–5.3)
RBC # BLD AUTO: 5.07 MILLION/UL (ref 3.8–5.1)
SODIUM SERPL-SCNC: 141 MMOL/L (ref 135–146)
WBC # BLD AUTO: 6.4 THOUSAND/UL (ref 3.8–10.8)

## 2025-09-19 ENCOUNTER — APPOINTMENT (OUTPATIENT)
Dept: DERMATOLOGY | Facility: CLINIC | Age: 85
End: 2025-09-19
Payer: MEDICARE

## 2025-09-22 ENCOUNTER — APPOINTMENT (OUTPATIENT)
Dept: DERMATOLOGY | Facility: CLINIC | Age: 85
End: 2025-09-22
Payer: MEDICARE

## 2025-11-13 ENCOUNTER — APPOINTMENT (OUTPATIENT)
Dept: PRIMARY CARE | Facility: CLINIC | Age: 85
End: 2025-11-13
Payer: MEDICARE

## 2026-01-09 ENCOUNTER — APPOINTMENT (OUTPATIENT)
Dept: DERMATOLOGY | Facility: CLINIC | Age: 86
End: 2026-01-09
Payer: MEDICARE

## 2026-02-09 ENCOUNTER — APPOINTMENT (OUTPATIENT)
Dept: DERMATOLOGY | Facility: CLINIC | Age: 86
End: 2026-02-09
Payer: MEDICARE

## 2026-02-11 ENCOUNTER — APPOINTMENT (OUTPATIENT)
Dept: NEUROLOGY | Facility: CLINIC | Age: 86
End: 2026-02-11
Payer: MEDICARE

## 2026-08-19 ENCOUNTER — APPOINTMENT (OUTPATIENT)
Dept: NEUROLOGY | Facility: CLINIC | Age: 86
End: 2026-08-19
Payer: MEDICARE